# Patient Record
Sex: FEMALE | Race: WHITE | Employment: OTHER | URBAN - METROPOLITAN AREA
[De-identification: names, ages, dates, MRNs, and addresses within clinical notes are randomized per-mention and may not be internally consistent; named-entity substitution may affect disease eponyms.]

---

## 2017-04-11 ENCOUNTER — TRANSFERRED RECORDS (OUTPATIENT)
Dept: HEALTH INFORMATION MANAGEMENT | Facility: CLINIC | Age: 47
End: 2017-04-11

## 2017-06-10 ENCOUNTER — HEALTH MAINTENANCE LETTER (OUTPATIENT)
Age: 47
End: 2017-06-10

## 2017-09-19 ENCOUNTER — OFFICE VISIT (OUTPATIENT)
Dept: NEUROLOGY | Facility: CLINIC | Age: 47
End: 2017-09-19
Attending: PSYCHIATRY & NEUROLOGY
Payer: COMMERCIAL

## 2017-09-19 VITALS
DIASTOLIC BLOOD PRESSURE: 59 MMHG | HEART RATE: 59 BPM | HEIGHT: 64 IN | SYSTOLIC BLOOD PRESSURE: 105 MMHG | BODY MASS INDEX: 21.12 KG/M2 | WEIGHT: 123.7 LBS

## 2017-09-19 DIAGNOSIS — G35 MS (MULTIPLE SCLEROSIS) (H): Primary | ICD-10-CM

## 2017-09-19 PROCEDURE — 99212 OFFICE O/P EST SF 10 MIN: CPT | Mod: ZF

## 2017-09-19 ASSESSMENT — PAIN SCALES - GENERAL: PAINLEVEL: NO PAIN (0)

## 2017-09-19 NOTE — LETTER
"9/19/2017      RE: Ewa Salter  [address] [facility] [address]       REASON FOR VISIT:  Ewa Salter is a 47-year-old woman who I follow for multiple sclerosis.  She returns for routine followup.  She was last seen in 09/2016.      HISTORY OF PRESENT ILLNESS:  Ewa has been generally neurologically stable, except she feels that her hearing has gradually been worsening.  She says her children have noticed that she has a harder time hearing them, and that she has to turn the TV up louder.  She has not had any tinnitus, dizziness, double vision, etc.  Her Crohn's disease has been going great, and she was actually taken off the azathioprine in December.  This is because she met criteria for \"deep remission\", in that she had been symptom-free for 5 years, had clean endoscopies and stable/normal CRP.  She has continued to have no problems from a GI perspective since stopping the azathioprine.  She has essentially no residual MS symptoms except for some very mild sensory deficit in the left arm.  She takes 4000 units of vitamin D per day and her level in April was perfect at 70.  She shows me CBC results from March of this year which had a normal absolute lymphocyte count of 1.3.  She continues Tecfidera for MS and tolerates it well.     PHYSICAL EXAMINATION:     VITAL SIGNS:   Blood pressure 105/59.  Pulse 59.  Respiratory rate 12.   GENERAL:   She is alert and oriented.  Affect is bright and language functions are normal.     NEUROLOGIC:  Eye movements are full without diplopia or nystagmus.  Facial strength and sensation are normal.  Palate elevates midline.  Muscle bulk, tone, strength and dexterity are normal in the arms and legs.  Light touch is intact in the hands and feet.  Deep tendon reflexes are normal and symmetric and her gait is normal including normal tandem walk and negative Romberg.      IMPRESSION:  Relapsing remitting multiple sclerosis, clinically stable on dimethyl fumarate.      I spent " "30 minutes with Ewa today, greater than 50% of which was spent in counseling and coordination of care.  She asks if there is any equivalent of \"deep remission\" in MS and we discussed that in detail.  I told her that yes indeed, the relapsing stage of multiple eventually stops in all patients, and for those who do not transition into the secondary progressive phase of the disease this can be considered a permanent remission.  We discussed how long to continue immunotherapy given this fact.  I told her I generally recommend continuing treatment at least through the mid-50s if not through the 50s entirely.  We also discussed MRI monitoring and she had lots of questions about current research we are involved in which we discussed as well.      PLAN:  Follow up in 1 year with MRI at that time.      MD LEVY Haro MD             D: 2017 10:39   T: 2017 11:22   MT: SPENCER      Name:     EWA LOCO   MRN:      [id number]        Account:      [id number]   :      1970           Service Date: 2017      Document: [id number]        Levy Nettles MD      "

## 2017-09-19 NOTE — MR AVS SNAPSHOT
After Visit Summary   9/19/2017    Ewa Salter    MRN: [id number]           Patient Information     Date Of Birth          1970        Visit Information        Provider Department      9/19/2017 10:00 AM Levy Nettles MD [facility] Multiple Sclerosis        Today's Diagnoses     MS (multiple sclerosis) (H)    -  1       Follow-ups after your visit        Follow-up notes from your care team     Return in about 1 year (around 9/19/2018) for with MRI.      Your next 10 appointments already scheduled     Sep 18, 2018  9:15 AM CDT   (Arrive by 9:00 AM)   MR BRAIN W/O & W CONTRAST with [id number]   [facility] [place] [facility] ([facility])    [address]  1st Floor  [place] [address] [phone]           Take your medicines as usual, unless your doctor tells you not to. Bring a list of your current medicines to your exam (including vitamins, minerals and over-the-counter drugs).  You will be given intravenous contrast for this exam. To prepare:   The day before your exam, drink extra fluids at least six 8-ounce glasses (unless your doctor tells you to restrict your fluids).   Have a blood test (creatinine test) within 30 days of your exam. Go to your clinic or Diagnostic Imaging Department for this test.  The MRI machine uses a strong magnet. Please wear clothes without metal (snaps, zippers). A sweatsuit works well, or we may give you a hospital gown.  Please remove any body piercings and hair extensions before you arrive. You will also remove watches, jewelry, hairpins, wallets, dentures, partial dental plates and hearing aids. You may wear contact lenses, and you may be able to wear your rings. We have a safe place to keep your personal items, but it is safer to leave them at home.   **IMPORTANT** THE INSTRUCTIONS BELOW ARE ONLY FOR THOSE PATIENTS WHO HAVE BEEN TOLD THEY WILL RECEIVE SEDATION OR GENERAL ANESTHESIA DURING THEIR MRI PROCEDURE:   IF YOU WILL RECEIVE SEDATION (take medicine to help you relax during your exam):   You must get the medicine from your doctor before you arrive. Bring the medicine to the exam. Do not take it at home.   Arrive one hour early. Bring someone who can take you home after the test. Your medicine will make you sleepy. After the exam, you may not drive, take a bus or take a taxi by yourself.   No eating 8 hours before your exam. You may have clear liquids up until 4 hours before your exam. (Clear liquids include water, clear tea, black coffee and fruit juice without pulp.)  IF YOU WILL RECEIVE ANESTHESIA (be asleep for your exam):   Arrive 1 1/2 hours early. Bring someone who can take you home after the test. You may not drive, take a bus or take a taxi by yourself.   No eating 8 hours before your exam. You may have clear liquids up until 4 hours before your exam. (Clear liquids include water, clear tea, black coffee and fruit juice without pulp.)  Please call the Imaging Department at your exam site with any questions.            Sep 18, 2018 10:30 AM CDT   (Arrive by 10:15 AM)   Return Multiple Sclerosis with Levy Nettles MD   [facility] ([facility])    [address] [address] [phone]              Who to contact     If you have questions or need follow up information about today's clinic visit or your schedule please contact [facility] MULTIPLE SCLEROSIS directly at [phone].  Normal or non-critical lab and imaging results will be communicated to you by MyChart, letter or phone within 4 business days after the clinic has received the results. If you do not hear from us within 7 days, please contact the clinic through [facility] or phone. If you have a critical or abnormal lab result, we will notify you by phone as soon as possible.  Submit refill requests through [employer] or call your pharmacy and they will forward the refill  "request to us. Please allow 3 business days for your refill to be completed.          Additional Information About Your Visit        [facility]anc[facility] Information     [facility] gives you secure access to your electronic health record. If you see a primary care provider, you can also send messages to your care team and make appointments. If you have questions, please call your primary care clinic.  If you do not have a primary care provider, please call [phone] and they will assist you.        Care EveryWhere ID     This is your Care EveryWhere ID. This could be used by other organizations to access your [place] medical records  [id number]        Your Vitals Were     Pulse Height BMI (Body Mass Index)             59 1.626 m (5' 4\") 21.23 kg/m2          Blood Pressure from Last 3 Encounters:   09/19/17 105/59   09/13/16 99/63   09/15/15 114/61    Weight from Last 3 Encounters:   09/19/17 56.1 kg (123 lb 11.2 oz)   03/17/15 54.4 kg (120 lb)   03/18/14 54.4 kg (120 lb)               Primary Care Provider Office Phone # Fax #    Tesfaye Reyez [phone]       [address] [address]        Equal Access to Services     KARYN BARON : Hadii gaston ku hadasho Soomaali, waaxda luqadaha, qaybta kaalmada adeegyada, rafia dealn ck king. So [facility] [phone].    ATENCIÓN: Si habla español, tiene a valerio disposición servicios gratuitos de asistencia lingüística. Llame al [phone].    We comply with applicable federal civil rights laws and [place] laws. We do not discriminate on the basis of race, color, national origin, age, disability sex, sexual orientation or gender identity.            Thank you!     Thank you for choosing [facility] MULTIPLE SCLEROSIS  for your care. Our goal is always to provide you with excellent care. Hearing back from our patients is one way we can continue to improve our services. Please take a few minutes to complete the written " survey that you may receive in the mail after your visit with us. Thank you!             Your Updated Medication List - Protect others around you: Learn how to safely use, store and throw away your medicines at www.disposemymeds.org.          This list is accurate as of: 9/19/17 11:59 PM.  Always use your most recent med list.                   Brand Name Dispense Instructions for use Diagnosis    dimethyl fumarate 240 MG Cpdr     60 capsule    Take 1 capsule (240 mg) by mouth 2 times daily    Multiple sclerosis (H)       LOPERAMIDE HCL PO      Take 4 mg by mouth daily.        METAMUCIL PO      Take  by mouth See Admin Instructions.        vitamin D3 2000 UNITS Caps      Take 2 capsules by mouth daily.

## 2017-09-19 NOTE — NURSING NOTE
"Chief Complaint   Patient presents with     RECHECK     [facility] RETURN - MULTIPLE SCLEROSIS       Initial /59 (BP Location: Left arm, Patient Position: Sitting, Cuff Size: Adult Regular)  Pulse 59  Ht 1.626 m (5' 4\")  Wt 56.1 kg (123 lb 11.2 oz)  BMI 21.23 kg/m2 Estimated body mass index is 21.23 kg/(m^2) as calculated from the following:    Height as of this encounter: 1.626 m (5' 4\").    Weight as of this encounter: 56.1 kg (123 lb 11.2 oz).  Medication Reconciliation: complete     Gladis Valente MA      "

## 2017-09-19 NOTE — Clinical Note
"9/19/2017       RE: Ewa Salter  [address] [facility] [address]     Dear Colleague,    Thank you for referring your patient, Ewa Salter, to the [facility] MULTIPLE SCLEROSIS at [facility]. Please see a copy of my visit note below.    REASON FOR VISIT:  Ewa Salter is a 47-year-old woman who I follow for multiple sclerosis.  She returns for routine followup.  She was last seen in 09/2016.      HISTORY OF PRESENT ILLNESS:  Ewa has been generally neurologically stable, except she feels that her hearing has gradually been worsening.  She says her children have noticed that she has a harder time hearing them, and that she has to turn the TV up louder.  She has not had any tinnitus, dizziness, double vision, etc.  Her Crohn disease has been going great, and she was actually taken off the azathioprine in December.  This is because she met criteria for \"deep remission\", in that she had been symptom-free for 5 years, had clean endoscopies and stable/normal CRP.  She has continued to have no problems from a GI perspective since stopping the azathioprine.  She has essentially no residual MS symptoms except for some very mild sensory deficit in the left arm.  She takes 4000 units of vitamin D per day and her level in April was perfect at 70.  She shows me CBC results from March of this year which had a normal absolute lymphocyte count of 1.3.      PHYSICAL EXAMINATION:     VITAL SIGNS:   Blood pressure 105/59.  Pulse 59.  Respiratory rate 12.   GENERAL:   She is alert and oriented.  Affect is bright and language functions are normal.     NEUROLOGIC:  Eye movements are full without diplopia or nystagmus.  Facial strength and sensation are normal.  Palate elevates midline.  Muscle bulk, tone, strength and dexterity are normal in the arms and legs.  Light touch is intact in the hands and feet.  Deep tendon reflexes are normal and symmetric and her gait is normal " "including normal tandem walk and negative Romberg.      IMPRESSION:  Relapsing remitting multiple sclerosis, clinically stable on dimethyl fumarate.      I spent 30 minutes with Ewa today, greater than 50% of which was spent in counseling and coordination of care.  She asks if there is any equivalent of \"deep remission\" in MS and we discussed that in detail.  I told her that yes indeed, the relapsing stage of multiple eventually stops in all patients, and for those who do not transition into the secondary progressive phase of the disease this can be considered a permanent remission.  We discussed how long to continue immunotherapy given this fact.  I told her I generally recommend continuing treatment at least through the mid-50s if not through the 50s entirely.  We also discussed MRI monitoring and she had lots of questions about current research we are involved in which we discussed as well.      PLAN:  Follow up in 1 year with MRI at that time.      MD LEVY Haro MD             D: 2017 10:39   T: 2017 11:22   MT: SPENCER      Name:     EWA LOCO   MRN:      [id number]        Account:      [id number]   :      1970           Service Date: 2017      Document: [id number]       Again, thank you for allowing me to participate in the care of your patient.      Sincerely,    Levy Nettles MD    "

## 2017-09-19 NOTE — PROGRESS NOTES
"REASON FOR VISIT:  Ewa Salter is a 47-year-old woman who I follow for multiple sclerosis.  She returns for routine followup.  She was last seen in 09/2016.      HISTORY OF PRESENT ILLNESS:  Ewa has been generally neurologically stable, except she feels that her hearing has gradually been worsening.  She says her children have noticed that she has a harder time hearing them, and that she has to turn the TV up louder.  She has not had any tinnitus, dizziness, double vision, etc.  Her Crohn's disease has been going great, and she was actually taken off the azathioprine in December.  This is because she met criteria for \"deep remission\", in that she had been symptom-free for 5 years, had clean endoscopies and stable/normal CRP.  She has continued to have no problems from a GI perspective since stopping the azathioprine.  She has essentially no residual MS symptoms except for some very mild sensory deficit in the left arm.  She takes 4000 units of vitamin D per day and her level in April was perfect at 70.  She shows me CBC results from March of this year which had a normal absolute lymphocyte count of 1.3.  She continues Tecfidera for MS and tolerates it well.     PHYSICAL EXAMINATION:     VITAL SIGNS:   Blood pressure 105/59.  Pulse 59.  Respiratory rate 12.   GENERAL:   She is alert and oriented.  Affect is bright and language functions are normal.     NEUROLOGIC:  Eye movements are full without diplopia or nystagmus.  Facial strength and sensation are normal.  Palate elevates midline.  Muscle bulk, tone, strength and dexterity are normal in the arms and legs.  Light touch is intact in the hands and feet.  Deep tendon reflexes are normal and symmetric and her gait is normal including normal tandem walk and negative Romberg.      IMPRESSION:  Relapsing remitting multiple sclerosis, clinically stable on dimethyl fumarate.      I spent 30 minutes with Ewa today, greater than 50% of which was spent in counseling and " "coordination of care.  She asks if there is any equivalent of \"deep remission\" in MS and we discussed that in detail.  I told her that yes indeed, the relapsing stage of multiple eventually stops in all patients, and for those who do not transition into the secondary progressive phase of the disease this can be considered a permanent remission.  We discussed how long to continue immunotherapy given this fact.  I told her I generally recommend continuing treatment at least through the mid-50s if not through the 50s entirely.  We also discussed MRI monitoring and she had lots of questions about current research we are involved in which we discussed as well.      PLAN:  Follow up in 1 year with MRI at that time.      MD YAMILA Haro MD             D: 2017 10:39   T: 2017 11:22   MT: SPENCER      Name:     MELY LOCO   MRN:      [id number]        Account:      [id number]   :      1970           Service Date: 2017      Document: [id number]      "

## 2017-11-27 ENCOUNTER — TELEPHONE (OUTPATIENT)
Dept: NEUROLOGY | Facility: CLINIC | Age: 47
End: 2017-11-27

## 2017-11-27 DIAGNOSIS — G35 MULTIPLE SCLEROSIS (H): ICD-10-CM

## 2017-11-27 RX ORDER — DIMETHYL FUMARATE 240 MG/1
CAPSULE ORAL
Qty: 60 CAPSULE | Refills: 11 | Status: SHIPPED | OUTPATIENT
Start: 2017-11-27 | End: 2018-11-26

## 2017-11-27 NOTE — TELEPHONE ENCOUNTER
Prior Authorization Specialty Medication Request     Medication/Dose: Tecfidra 240mg  Frequency: BID                         Route: PO  Diagnosis and ICD: Relapsing Remitting Multiple Sclerosis, G35  New/Renewal/Insurance Change PA:Renewal     Important Lab Values:      Previously Tried and Failed Therapies: Been on Tecfidera since 2013, Copaxone 20mg prior to that     Rationale: Patient has been stable on current therapy.     Would you like to include any research articles? na                        If yes please include the hyperlink(s) below or fax @ [phone].                         (Include Name and MRN)     If you received a fax notification from an outside Pharmacy;  Pharmacy Name:[facility]  Pharmacy #:[phone]  Pharmacy Fax:[phone]

## 2017-11-27 NOTE — TELEPHONE ENCOUNTER
Prior Authorization Approval    Authorization Effective Date: 11/27/2017  Authorization Expiration Date:    Medication: Tecfidera 240mg APPROVED  Approved Dose/Quantity: 60 PER 30 DAYS  Reference #: N/A   Insurance Company: [employer] - Phone [phone] Fax [phone]  Expected CoPay: N/A     CoPay Card Available:      Foundation Assistance Needed:    Which Pharmacy is filling the prescription (Not needed for infusion/clinic administered): [facility] [place]  Pharmacy Notified: No  Patient Notified: No

## 2017-11-27 NOTE — TELEPHONE ENCOUNTER
PA Initiation    Medication: Tecfidera 240mg  Insurance Company: [employer] - Phone [phone] Fax [phone]  Pharmacy Filling the Rx: [facility] [place]  Filling Pharmacy Phone:    Filling Pharmacy Fax:    Start Date: 11/27/2017

## 2017-11-27 NOTE — TELEPHONE ENCOUNTER
Received refill request for Tecfidera from [facility]; Patient was last seen in September and has follow up appointment in September 2018 with Dr. Nettles; Refilled for 1 year per MS refill protocol.    Sheela Mathews MS RN Care Coordinator

## 2018-03-07 ENCOUNTER — TELEPHONE (OUTPATIENT)
Dept: NEUROLOGY | Facility: CLINIC | Age: 48
End: 2018-03-07

## 2018-03-07 DIAGNOSIS — G35 MULTIPLE SCLEROSIS (H): Primary | ICD-10-CM

## 2018-03-07 NOTE — TELEPHONE ENCOUNTER
"Per call center: Call from PT as she is experiencing some tingling/numbness in her leg.  She recently had a procedure done and needs to find out if it is procedure related or something with the MS as she has never experienced this type of feeling before.  I attemped to reach Nayla and since PT stated this is not extremely urgent, she is ok with a message but would like a CB today as she is concerned.   PT can be reached at [phone]     Called patient to discuss. Patient states she had surgery on her Left knee (partical meniscus removal) in December. Symptom of numbness/tingling has been going on for about a week and is diffuse from above the knee to the foot. It's hard for her to pinpoint. Feeling comes and goes. \"It's a weird sensation, and it doesn't feel right.\" Only other symptom she has noticed is that she is very tired, especially the last few days. No B/B changes, vision, balance issues. No recent illness.   Nothing makes it feel better (rest, ice).   Dr. Nettles, patient is unsure of what to do moving forward. She isn't sure if it's MS related or from her surgery. What do you recommend?    Thanks,  Tiffanie Thomas, RN Care Coordinator    "

## 2018-03-07 NOTE — TELEPHONE ENCOUNTER
Called Ewa to go over Dr. Nettles's input. There was no answer, left [facility] asking for a call back.

## 2018-03-07 NOTE — TELEPHONE ENCOUNTER
The best way to determine if this is due to new MS activity is to get MRI (cervical and thoracic, with and without).  And since the surgery was several months ago and symptoms started only a week ago, I think attributing it to the surgery is a bit of a stretch.  I suggest we do the MRIs - it's been a long time since she has had spinal imaging anyway.

## 2018-03-07 NOTE — TELEPHONE ENCOUNTER
Received return call from patient. She is agreeable to MRIs. Orders placed on behalf of Dr. Nettles. Provided patient with imaging phone number to call and schedule.

## 2018-03-08 ENCOUNTER — TRANSFERRED RECORDS (OUTPATIENT)
Dept: HEALTH INFORMATION MANAGEMENT | Facility: CLINIC | Age: 48
End: 2018-03-08

## 2018-03-08 NOTE — TELEPHONE ENCOUNTER
Patient called requesting MRI orders be faxed to [facility] (phone [phone] fax [phone]); Orders have been faxed accordingly.    Sheela Mathews MS RN Care Coordinator

## 2018-03-12 NOTE — TELEPHONE ENCOUNTER
Per call center: Call from PT re: MRI results.  She was told by [facility] that they would be sent her within 48 hours and I let her know we received them.  She just needs a call to [phone] to discuss the results.     Dr. Nettles, please take a look at her MRI results when you get chance. Images are in PACS.     Thanks,  Tiffanie Thomas, RN Care Coordinator

## 2018-03-12 NOTE — TELEPHONE ENCOUNTER
The thoracic spine MRI is normal (the one most likely to show a new lesion if her symptoms were due to MS relapse).  The cervical spine MRI shows one new lesion on the right side of the spinal cord at the C3 level, which does not enhance.  This is almost certainly not related to her symptoms, because a) it is not enhancing, and b) it would be expected to cause numbness/tingling on the right side of the head and neck, not the left leg.  So, no evidence of MS relapse on these MRIs (good!).  How is she doing symptom-wise - is the leg still numb/tingly?  Is it getting better, getting worse, or staying the same?

## 2018-03-12 NOTE — TELEPHONE ENCOUNTER
Images are still not uploaded to patient's chart. Called [facility]room and they don't have anything. Called [facility] and requested that images be pushed. They will do this.

## 2018-03-13 NOTE — TELEPHONE ENCOUNTER
"Called and spoke with Ewa and reported her MRI results as outlined by Dr. Nettles. She states that her symptoms are unchanged. \"It's like there is a jolt of electricity going through my leg.\" This starts above the site of her knee surgery and travels below the site. The symptoms does not seem to be correlated with any particular activity or position. It happens frequently. Patient is wondering what the next appropriate follow up is? Dr. Nettles, do you have any advice? I told her it may not be a bad idea to update her surgeon since we have not found a cause for this r/t to her MS.   "

## 2018-03-22 NOTE — TELEPHONE ENCOUNTER
If it's painful/uncomfortable/bothering her, can treat symptomatically with gabapentin (would start at 300 mg bid, can titrate up at 300 mg per day as tolerated, up to max of 900 mg tid).  I also agree with keeping the surgeon updated

## 2018-03-23 NOTE — TELEPHONE ENCOUNTER
[email or web address] message sent to patient to follow up on how she is doing and the potential for medication treatment for her symptoms.    Sheela Mathews MS RN Care Coordinator

## 2018-09-18 ENCOUNTER — OFFICE VISIT (OUTPATIENT)
Dept: NEUROLOGY | Facility: CLINIC | Age: 48
End: 2018-09-18
Attending: PSYCHIATRY & NEUROLOGY
Payer: COMMERCIAL

## 2018-09-18 ENCOUNTER — RADIANT APPOINTMENT (OUTPATIENT)
Dept: MRI IMAGING | Facility: CLINIC | Age: 48
End: 2018-09-18
Attending: PSYCHIATRY & NEUROLOGY
Payer: COMMERCIAL

## 2018-09-18 VITALS
SYSTOLIC BLOOD PRESSURE: 114 MMHG | WEIGHT: 125.4 LBS | HEART RATE: 65 BPM | BODY MASS INDEX: 21.41 KG/M2 | DIASTOLIC BLOOD PRESSURE: 70 MMHG | HEIGHT: 64 IN

## 2018-09-18 DIAGNOSIS — G35 MULTIPLE SCLEROSIS (H): Primary | ICD-10-CM

## 2018-09-18 DIAGNOSIS — K50.913 CROHN'S DISEASE WITH FISTULA, UNSPECIFIED GASTROINTESTINAL TRACT LOCATION (H): ICD-10-CM

## 2018-09-18 DIAGNOSIS — G35 MS (MULTIPLE SCLEROSIS) (H): ICD-10-CM

## 2018-09-18 RX ORDER — GADOBUTROL 604.72 MG/ML
7.5 INJECTION INTRAVENOUS ONCE
Status: COMPLETED | OUTPATIENT
Start: 2018-09-18 | End: 2018-09-18

## 2018-09-18 RX ADMIN — GADOBUTROL 5.5 ML: 604.72 INJECTION INTRAVENOUS at 09:18

## 2018-09-18 ASSESSMENT — PAIN SCALES - GENERAL: PAINLEVEL: NO PAIN (0)

## 2018-09-18 NOTE — MR AVS SNAPSHOT
After Visit Summary   9/18/2018    Ewa Salter    MRN: [id number]           Patient Information     Date Of Birth          1970        Visit Information        Provider Department      9/18/2018 10:30 AM Levy Nettles MD [facility] Multiple Sclerosis        Today's Diagnoses     Multiple sclerosis (H)    -  1      Care Instructions    1.  Blood test for LUANNE virus antibody (can get through your primary clinic).  2.  F/u in one year  3.  Continue Tecfidera   4.  F/u in one year with MRI          Follow-ups after your visit        Follow-up notes from your care team     Return in about 1 year (around 9/18/2019) for with MRI.      Your next 10 appointments already scheduled     Sep 17, 2019  9:15 AM CDT   MR BRAIN W/O CONTRAST with [id number]   [facility] [place] [facility] ([facility])    [address] [address] [phone]           Take your medicines as usual, unless your doctor tells you not to. Bring a list of your current medicines to your exam (including vitamins, minerals and over-the-counter drugs). Also bring the results of similar scans you may have had.  Please remove any body piercings and hair extensions before you arrive.  Follow your doctor s orders. If you do not, we may have to postpone your exam.  You may or may not receive IV contrast for this exam pending the discretion of the Radiologist.  You do not need to do anything special to prepare.  The MRI machine uses a strong magnet. Please wear clothes without metal (snaps, zippers). A sweatsuit works well, or we may give you a hospital gown.   **IMPORTANT** THE INSTRUCTIONS BELOW ARE ONLY FOR THOSE PATIENTS WHO HAVE BEEN PRESCRIBED SEDATION OR GENERAL ANESTHESIA DURING THEIR MRI PROCEDURE:  IF YOUR DOCTOR PRESCRIBED ORAL SEDATION (take medicine to help you relax during your exam):   You must get the medicine from your doctor (oral medication) before you arrive.  Bring the medicine to the exam. Do not take it at home. You ll be told when to take it upon arriving for your exam.   Arrive one hour early. Bring someone who can take you home after the test. Your medicine will make you sleepy. After the exam, you may not drive, take a bus or take a taxi by yourself.  IF YOUR DOCTOR PRESCRIBED IV SEDATION:   Arrive one hour early. Bring someone who can take you home after the test. Your medicine will make you sleepy. After the exam, you may not drive, take a bus or take a taxi by yourself.   No eating 6 hours before your exam. You may have clear liquids up until 4 hours before your exam. (Clear liquids include water, clear tea, black coffee and fruit juice without pulp.)  IF YOUR DOCTOR PRESCRIBED ANESTHESIA (be asleep for your exam):   Arrive 1 1/2 hours early. Bring someone who can take you home after the test. You may not drive, take a bus or take a taxi by yourself.   No eating 8 hours before your exam. You may have clear liquids up until 4 hours before your exam. (Clear liquids include water, clear tea, black coffee and fruit juice without pulp.)   You will spend four to five hours in the recovery room.  Please call the Imaging Department at your exam site with any questions.            Sep 17, 2019 10:30 AM CDT   (Arrive by 10:15 AM)   Return Multiple Sclerosis with Levy Nettles MD   [facility] Multiple Sclerosis ([facility])    [address] [place] [address] [phone]              Future tests that were ordered for you today     Open Future Orders        Priority Expected Expires Ordered    MR Brain w/o Contrast Routine 9/17/2019 9/18/2019 9/18/2018    LUANNE Virus Antibody (with Index) with Reflex to Inhibition Assay - [id number]: Laboratory Miscellaneous Order Routine  9/18/2019 9/18/2018            Who to contact     If you have questions or need follow up information about today's clinic visit or your schedule  "please contact [facility] directly at [phone].  Normal or non-critical lab and imaging results will be communicated to you by MyChart, letter or phone within 4 business days after the clinic has received the results. If you do not hear from us within 7 days, please contact the clinic through MyChart or phone. If you have a critical or abnormal lab result, we will notify you by phone as soon as possible.  Submit refill requests through [employer] or call your pharmacy and they will forward the refill request to us. Please allow 3 business days for your refill to be completed.          Additional Information About Your Visit        [employer] Information     [employer] gives you secure access to your electronic health record. If you see a primary care provider, you can also send messages to your care team and make appointments. If you have questions, please call your primary care clinic.  If you do not have a primary care provider, please call [phone] and they will assist you.        Care EveryWhere ID     This is your Care EveryWhere ID. This could be used by other organizations to access your [facility] medical records  [id number]        Your Vitals Were     Pulse Height BMI (Body Mass Index)             65 1.626 m (5' 4\") 21.52 kg/m2          Blood Pressure from Last 3 Encounters:   09/18/18 114/70   09/19/17 105/59   09/13/16 99/63    Weight from Last 3 Encounters:   09/18/18 56.9 kg (125 lb 6.4 oz)   09/19/17 56.1 kg (123 lb 11.2 oz)   03/17/15 54.4 kg (120 lb)               Primary Care Provider Office Phone # Fax #    Tesfaye Roweisidoro [phone]       [address] [address]        Equal Access to Services     [facility] LORAINE AH: Nia Ty, wajasonda luqadaha, qaybta kaalmada frank, rafia king. So [facility] [phone].    ATENCIÓN: Si habla español, tiene a valerio disposición servicios gratuitos de " asistencia lingüística. Alf al [phone].    We comply with applicable federal civil rights laws and [place] laws. We do not discriminate on the basis of race, color, national origin, age, disability, sex, sexual orientation, or gender identity.            Thank you!     Thank you for choosing [facility]  for your care. Our goal is always to provide you with excellent care. Hearing back from our patients is one way we can continue to improve our services. Please take a few minutes to complete the written survey that you may receive in the mail after your visit with us. Thank you!             Your Updated Medication List - Protect others around you: Learn how to safely use, store and throw away your medicines at www.disposemymeds.org.          This list is accurate as of 9/18/18 11:38 AM.  Always use your most recent med list.                   Brand Name Dispense Instructions for use Diagnosis    LOPERAMIDE HCL PO      Take 4 mg by mouth daily.        METAMUCIL PO      Take  by mouth See Admin Instructions.        TECFIDERA 240 MG Cpdr   Generic drug:  dimethyl fumarate     60 capsule    TAKE 1 CAPSULE BY MOUTH TWICE A DAY    Multiple sclerosis (H)       vitamin D3 2000 units Caps      Take 2 capsules by mouth daily.

## 2018-09-18 NOTE — PATIENT INSTRUCTIONS
1.  Blood test for LUANNE virus antibody (can get through your primary clinic).  2.  F/u in one year  3.  Continue Tecfidera   4.  F/u in one year with MRI

## 2018-09-18 NOTE — PROGRESS NOTES
"[facility] [place]   Neurology Progress Note  Ewa Salter  [id number]  09/18/2018    Brief Summary: Ewa Salter is a 48 year old year old female with h/o Crohn's (previously on azathioprine, s/p colectomy) who presents for f/u. She has been tolerating dimethyl fumarate for several years without clinical or radiographic evidence of relapse until today. MR Brain this morning revealed 3 new small lesions without clinical correlate.  sei  Subjective:  Notes progressively worsening hearing b/l over the past two years. No new memory, speech, or vision changes. No swallowing difficulty, weakness, numbness, clumsiness, or imbalance. Did have recent perianal fistula which required several surgeries and is concerning for Crohn's relapse.     Objective   /70 (BP Location: Left arm, Patient Position: Chair, Cuff Size: Adult Regular)  Pulse 65  Ht 1.626 m (5' 4\")  Wt 56.9 kg (125 lb 6.4 oz)  BMI 21.52 kg/m2  General: pt laying comfortably in bed, breathing easily on ra, in NAD   HEENT: no oral ulcers   Chest: cta   Heart: rrr  Abdomen: soft, nt, nd, +BS  Ext: no edema   Skin: no rashes  Neuro:   -MS: alert, speech fluent and coherent  -CN: vff, perrla, eomi, facial sensation and movement intact b/l, hearing intact to conversation, palate raises symmetrically, shoulder shrug and scm intact, tongue midline  -Motor: tone and bulk normal   --LUE: 5/5 shoulder abd, arm flex/ext, wrist flex/ext, finger flex/ext/abd/add. Slow R finger tapping.  --RUE: 5/5 shoulder abd, arm flex/ext, wrist flex/ext, finger flex/ext/abd/add  --LLE: 5/5 hip flexor, leg flex/ext, plantar/dorsiflexion. Slow L foot tapping.  --RLE: 5/5 hip flexor, leg flex/ext, plantar/dorsiflexion  -Reflexes: normoactive and symmetric at achilles, patella, brachioradialis, and biceps. Toes downgoing b/l   -Sensory: intact to light touch and pinprick in all extremities  -Coordination: intact to FNF, H2S b/l  -Gait: " normal station, stride length, and arm swing. Able to tandem walk.    Investigations    MR Brain (9/2018)  Impression:  1. The study demonstrates 15-20 foci of T2-hyperintensity within the  cerebral white matter consistent with the clinical suspicion of  demyelinating disease.   2. 3 new, but nonenhancing T2 hyperintense plaques in the inferior  right frontal lobe, posterior left frontal lobe, and left parietal  lobe.  3. No intracranial enhancement to suggest active demyelination.    Impression:  Ewa Salter is a 48 year old year old female h/o Crohn's who presents for f/u.     #Relapsing Remitting MS:  MR today revealed 3 new small lesions since last year, that were all asymptomatic. Will continue current disease modifying therapy (dimethyl fumarate) for now, but would have very low threshold to change to more intensive therapy. With new perianal fistula, concerning for Crohn's relapse, an obvious choice would be a drug that could treat both: natalizumab. Will thus check LUANNE virus antibody today. Several other options discussed: rituximab, teriflunomide. For her Crohn's, would AVOID TNFa inhibitors as these rx could lead to MS relapse. Okay to resume azathioprine if necessary.     Recommendations:   -continue dimethyl fumarate  -check CBC with diff, vitamin D level, LUANNE virus  -repeat MR Brain before next visit  -RTC 1 year  --if medical therapy needed for Crohn's, consider natalizumab (if LUANNE virus negative) as this could treat both MS and Crohn's. Avoid TNFa inhibitors for Crohn's as this could cause or predispose patient to MS relapse.      Patient was seen and discussed with attending neurologist, Dr. Tho San MD  Neurology G4  [phone]     I saw and examined this patient, and have read and edited the resident's note.  I agree with the findings, assessment, and plan.  I spent 44 minutes with the patient, greater than 50% in counseling and coordination of care and reviewing her MRI  together.  We discussed the implication of these new lesions, and treatment options given her apparently recurrent Crohn's disease.  She will be seeing a new gastroenterologist soon, and we will make no final decisions until after that.  If azathioprine is re-introduced, we may decide to have her stay on the DMF, otherwise options as discussed above.    Levy Nettles MD

## 2018-09-18 NOTE — LETTER
"9/18/2018      RE: Ewa Salter  [address] [place] [address]       [facility] [place]   Neurology Progress Note  Ewa Salter  [id number]  09/18/2018    Brief Summary: Ewa Salter is a 48 year old year old female with h/o Crohn's (previously on azathioprine, s/p colectomy) who presents for f/u. She has been tolerating dimethyl fumarate for several years without clinical or radiographic evidence of relapse until today. MR Brain this morning revealed 3 new small lesions without clinical correlate.  sei  Subjective:  Notes progressively worsening hearing b/l over the past two years. No new memory, speech, or vision changes. No swallowing difficulty, weakness, numbness, clumsiness, or imbalance. Did have recent perianal fistula which required several surgeries and is concerning for Crohn's relapse.     Objective   /70 (BP Location: Left arm, Patient Position: Chair, Cuff Size: Adult Regular)  Pulse 65  Ht 1.626 m (5' 4\")  Wt 56.9 kg (125 lb 6.4 oz)  BMI 21.52 kg/m2  General: pt laying comfortably in bed, breathing easily on ra, in NAD   HEENT: no oral ulcers   Chest: cta   Heart: rrr  Abdomen: soft, nt, nd, +BS  Ext: no edema   Skin: no rashes  Neuro:   -MS: alert, speech fluent and coherent  -CN: vff, perrla, eomi, facial sensation and movement intact b/l, hearing intact to conversation, palate raises symmetrically, shoulder shrug and scm intact, tongue midline  -Motor: tone and bulk normal   --LUE: 5/5 shoulder abd, arm flex/ext, wrist flex/ext, finger flex/ext/abd/add. Slow R finger tapping.  --RUE: 5/5 shoulder abd, arm flex/ext, wrist flex/ext, finger flex/ext/abd/add  --LLE: 5/5 hip flexor, leg flex/ext, plantar/dorsiflexion. Slow L foot tapping.  --RLE: 5/5 hip flexor, leg flex/ext, plantar/dorsiflexion  -Reflexes: normoactive and symmetric at achilles, patella, brachioradialis, and biceps. Toes downgoing b/l   -Sensory: intact to light " touch and pinprick in all extremities  -Coordination: intact to FNF, H2S b/l  -Gait: normal station, stride length, and arm swing. Able to tandem walk.    Investigations    MR Brain (9/2018)  Impression:  1. The study demonstrates 15-20 foci of T2-hyperintensity within the  cerebral white matter consistent with the clinical suspicion of  demyelinating disease.   2. 3 new, but nonenhancing T2 hyperintense plaques in the inferior  right frontal lobe, posterior left frontal lobe, and left parietal  lobe.  3. No intracranial enhancement to suggest active demyelination.    Impression:  Ewa Salter is a 48 year old year old female h/o Crohn's who presents for f/u.     #Relapsing Remitting MS:  MR today revealed 3 new small lesions since last year, that were all asymptomatic. Will continue current disease modifying therapy (dimethyl fumarate) for now, but would have very low threshold to change to more intensive therapy. With new perianal fistula, concerning for Crohn's relapse, an obvious choice would be a drug that could treat both: natalizumab. Will thus check LUANNE virus antibody today. Several other options discussed: rituximab, teriflunomide. For her Crohn's, would AVOID TNFa inhibitors as these rx could lead to MS relapse. Okay to resume azathioprine if necessary.     Recommendations:   -continue dimethyl fumarate  -check CBC with diff, vitamin D level, LUANNE virus  -repeat MR Brain before next visit  -RTC 1 year  --if medical therapy needed for Crohn's, consider natalizumab (if LUANNE virus negative) as this could treat both MS and Crohn's. Avoid TNFa inhibitors for Crohn's as this could cause or predispose patient to MS relapse.      Patient was seen and discussed with attending neurologist, Dr. Tho San MD  Neurology G4  [phone]     I saw and examined this patient, and have read and edited the resident's note.  I agree with the findings, assessment, and plan.  I spent 44 minutes with the  patient, greater than 50% in counseling and coordination of care and reviewing her MRI together.  We discussed the implication of these new lesions, and treatment options given her apparently recurrent Crohn's disease.  She will be seeing a new gastroenterologist soon, and we will make no final decisions until after that.  If azathioprine is re-introduced, we may decide to have her stay on the DMF, otherwise options as discussed above.      Levy Nettles MD

## 2018-09-18 NOTE — DISCHARGE INSTRUCTIONS
MRI Contrast Discharge Instructions    The IV contrast you received today will pass out of your body in your  urine. This will happen in the next 24 hours. You will not feel this process.  Your urine will not change color.    Drink at least 4 extra glasses of water or juice today (unless your doctor  has restricted your fluids). This reduces the stress on your kidneys.  You may take your regular medicines.    If you are on dialysis: It is best to have dialysis today.    If you have a reaction: Most reactions happen right away. If you have  any new symptoms after leaving the hospital (such as hives or swelling),  call your hospital at the correct number below. Or call your family doctor.  If you have breathing distress or wheezing, call 911.    Special instructions: ***    I have read and understand the above information.    Signature:______________________________________ Date:___________    Staff:__________________________________________ Date:___________     Time:__________    [place] [facility] Departments:    ___[place]: [phone]  ___[facility]: [phone]  ___[place]: [phone] ___[facility]: [phone]  ___[facility]: [phone]  ___[facility]: [phone]  ___[phone]  ___[facility]: [phone]  ___[phone]

## 2018-09-21 PROBLEM — K50.90 CROHN'S DISEASE (H): Status: ACTIVE | Noted: 2018-09-21

## 2018-11-26 DIAGNOSIS — G35 MULTIPLE SCLEROSIS (H): ICD-10-CM

## 2018-11-26 RX ORDER — DIMETHYL FUMARATE 240 MG/1
CAPSULE ORAL
Qty: 60 CAPSULE | Refills: 11 | Status: SHIPPED | OUTPATIENT
Start: 2018-11-26 | End: 2019-11-14

## 2018-11-26 RX ORDER — DIMETHYL FUMARATE 240 MG/1
CAPSULE ORAL
Qty: 60 CAPSULE | Refills: 11 | OUTPATIENT
Start: 2018-11-26

## 2018-11-26 NOTE — TELEPHONE ENCOUNTER
Received refill request for Tecfidera from [facility]; Patient was last seen in September and has follow up appointment in September 2019 with Dr. Nettles; Refilled for 1 year per MS refill protocol.    Sheela Mathews MS RN Care Coordinator

## 2019-07-02 ENCOUNTER — OFFICE VISIT (OUTPATIENT)
Dept: NEUROLOGY | Facility: CLINIC | Age: 49
End: 2019-07-02
Attending: PSYCHIATRY & NEUROLOGY
Payer: COMMERCIAL

## 2019-07-02 VITALS
SYSTOLIC BLOOD PRESSURE: 113 MMHG | WEIGHT: 124.9 LBS | DIASTOLIC BLOOD PRESSURE: 69 MMHG | HEIGHT: 64 IN | BODY MASS INDEX: 21.32 KG/M2 | HEART RATE: 80 BPM

## 2019-07-02 DIAGNOSIS — G35 MS (MULTIPLE SCLEROSIS) (H): Primary | ICD-10-CM

## 2019-07-02 ASSESSMENT — PAIN SCALES - GENERAL: PAINLEVEL: NO PAIN (0)

## 2019-07-02 ASSESSMENT — MIFFLIN-ST. JEOR: SCORE: 1186.54

## 2019-07-02 NOTE — PROGRESS NOTES
"Service Date: 07/02/2019      REASON FOR VISIT:  Ewa Salter is a 49-year-old woman who I follow for multiple sclerosis.  She returns for routine followup.  I last saw her in 09/2018.      HISTORY OF PRESENT ILLNESS:  Ewa tells me she has been doing \"pretty good but not at my best.\"  When I saw her last fall, she had developed a perianal fistula which was felt to possibly be a worsening/recurrence of her Crohn disease.  She was restarted on azathioprine which she had taken for many years, but this time she did not tolerate it well and ultimately developed pancreatitis.  The dose was decreased, but she still did not tolerate it and it was eventually stopped in April.  She had a \"pouchoscopy\" which suggested pouchitis.  She basically had been feeling poorly all spring, more fatigued, with GI cramping and bowel urgency.  She was started eventually on Flagyl, which again made her feel sick and ultimately switched to ciprofloxacin, which she is just finishing up.  She follows for gastrointestinal issues at [facility], formerly at [place].  She has never taken mycophenolate.      In the context of all of this ongoing GI symptomatology, she has felt some subtle new or worsened neurologic symptoms as well.  Her gait feels more \"stumbly\" although she says this is not something an impartial observer would likely recognize.  She just feel she cannot compensate as well if she misses a step or walks on an uneven surface.  She has not had any falls.  She also has developed some mild numbness in the right hand that is basically constant and she is not really sure of how long it has been there.  Motor function in the hands and arms is fine.  She does not have any vision loss, double vision, etc.  No vertigo.  No dysphagia or dysarthria.  She has some longstanding word finding difficulties, but no overt cognitive impairment and continues to work at a cognitively demanding job as an .  She has some mild " bladder urgency but no incontinence.      PHYSICAL EXAMINATION:  Blood pressure 113/69, pulse 80.  Weight 124 pounds.  She is alert and oriented.  Affect is bright and language functions are normal.  Cranial nerves are unremarkable.  Muscle bulk, tone, strength and dexterity are normal in the arms and legs.  Light touch is intact in the hands and feet.  Reflexes are normal and symmetric and her gait is narrow based and stable with normal tandem walk and negative Romberg.      IMPRESSION:  Relapsing remitting multiple sclerosis, clinically stable despite some subjective complaints of subtle balance disturbance and right hand numbness.  I spent 42 minutes with Ewa today, greater than 50% of which was spent in counseling and coordination of care.  We also reviewed again her last MRI scan which showed a few new lesions.  We discussed a variety of issues today.  First of all, we discussed the possible explanations for new or worsened neurologic symptoms in a patient with MS.  These include relapse, pseudorelapse, entering the progressive stage of the disease, or a process entirely separate from MS.  The timing of her new symptoms is not consistent with MS relapse.  I find no evidence on exam of secondary progressive MS.  I told her I think most likely this is either subtle worsening of symptoms in the context of her ongoing GI issues this spring or perhaps just more related to normal aging.  With the new lesions last year, we had planned on repeating MRI this year and that is scheduled for September.  I told her I think we should move that up and do it essentially now.  If there were new lesions, I would recommend treatment change and we discussed the options for that.  Basically, that would be either natalizumab or rituximab.  She did not end up getting the LUANNE virus antibody we ordered last fall because she tells me her gastroenterologist told her that would not be an option for her because of her long duration of  treatment with azathioprine.  I told her I disagree completely, and the important factor is whether or not she harbors the LUANNE virus.  Prior immunotherapy was a factor in assessing PML risk before we had the LUANNE virus antibody test, but for those who do not harbor the virus, there is no risk of getting the infection no matter what their prior immunosuppression.      PLAN:     1.  Reschedule MRI of the brain at her convenience.   2.  Return to clinic in 6 months.         YAMILA NOVAK MD             D: 2019   T: 2019   MT: [email or web address]      Name:     MELY LOCO   MRN:      [id number]        Account:      [id number]   :      1970           Service Date: 2019      Document: [id number]

## 2019-07-02 NOTE — LETTER
"7/2/2019      RE: Ewa Salter  [address] [place] [address]       Service Date: 07/02/2019      REASON FOR VISIT:  Ewa Salter is a 49-year-old woman who I follow for multiple sclerosis.  She returns for routine followup.  I last saw her in 09/2018.      HISTORY OF PRESENT ILLNESS:  Ewa tells me she has been doing \"pretty good but not at my best.\"  When I saw her last fall, she had developed a perianal fistula which was felt to possibly be a worsening/recurrence of her Crohn disease.  She was restarted on azathioprine which she had taken for many years, but this time she did not tolerate it well and ultimately developed pancreatitis.  The dose was decreased, but she still did not tolerate it and it was eventually stopped in April.  She had a \"pouchoscopy\" which suggested pouchitis.  She basically had been feeling poorly all spring, more fatigued, with GI cramping and bowel urgency.  She was started eventually on Flagyl, which again made her feel sick and ultimately switched to ciprofloxacin, which she is just finishing up.  She follows for gastrointestinal issues at [facility], formerly at [facility].  She has never taken mycophenolate.      In the context of all of this ongoing GI symptomatology, she has felt some subtle new or worsened neurologic symptoms as well.  Her gait feels more \"stumbly\" although she says this is not something an impartial observer would likely recognize.  She just feel she cannot compensate as well if she misses a step or walks on an uneven surface.  She has not had any falls.  She also has developed some mild numbness in the right hand that is basically constant and she is not really sure of how long it has been there.  Motor function in the hands and arms is fine.  She does not have any vision loss, double vision, etc.  No vertigo.  No dysphagia or dysarthria.  She has some longstanding word finding difficulties, but no overt cognitive impairment and " continues to work at a cognitively demanding job as an .  She has some mild bladder urgency but no incontinence.      PHYSICAL EXAMINATION:  Blood pressure 113/69, pulse 80.  Weight 124 pounds.  She is alert and oriented.  Affect is bright and language functions are normal.  Cranial nerves are unremarkable.  Muscle bulk, tone, strength and dexterity are normal in the arms and legs.  Light touch is intact in the hands and feet.  Reflexes are normal and symmetric and her gait is narrow based and stable with normal tandem walk and negative Romberg.      IMPRESSION:  Relapsing remitting multiple sclerosis, clinically stable despite some subjective complaints of subtle balance disturbance and right hand numbness.  I spent 42 minutes with Ewa today, greater than 50% of which was spent in counseling and coordination of care.  We also reviewed again her last MRI scan which showed a few new lesions.  We discussed a variety of issues today.  First of all, we discussed the possible explanations for new or worsened neurologic symptoms in a patient with MS.  These include relapse, pseudorelapse, entering the progressive stage of the disease, or a process entirely separate from MS.  The timing of her new symptoms is not consistent with MS relapse.  I find no evidence on exam of secondary progressive MS.  I told her I think most likely this is either subtle worsening of symptoms in the context of her ongoing GI issues this spring or perhaps just more related to normal aging.  With the new lesions last year, we had planned on repeating MRI this year and that is scheduled for September.  I told her I think we should move that up and do it essentially now.  If there were new lesions, I would recommend treatment change and we discussed the options for that.  Basically, that would be either natalizumab or rituximab.  She did not end up getting the LUANNE virus antibody we ordered last fall because she tells me her  gastroenterologist told her that would not be an option for her because of her long duration of treatment with azathioprine.  I told her I disagree completely, and the important factor is whether or not she harbors the LUANNE virus.  Prior immunotherapy was a factor in assessing PML risk before we had the LUANNE virus antibody test, but for those who do not harbor the virus, there is no risk of getting the infection no matter what their prior immunosuppression.      PLAN:     1.  Reschedule MRI of the brain at her convenience.   2.  Return to clinic in 6 months.         YAMILA NOVAK MD             D: 2019   T: 2019   MT: virgilio      Name:     MELY LOCO   MRN:      [id number]        Account:      [id number]   :      1970           Service Date: 2019      Document: [id number]

## 2019-07-25 ENCOUNTER — ANCILLARY PROCEDURE (OUTPATIENT)
Dept: MRI IMAGING | Facility: CLINIC | Age: 49
End: 2019-07-25
Attending: PSYCHIATRY & NEUROLOGY
Payer: COMMERCIAL

## 2019-07-25 DIAGNOSIS — G35 MULTIPLE SCLEROSIS (H): ICD-10-CM

## 2019-08-21 ENCOUNTER — TELEPHONE (OUTPATIENT)
Dept: NEUROLOGY | Facility: CLINIC | Age: 49
End: 2019-08-21

## 2019-08-21 NOTE — TELEPHONE ENCOUNTER
[facility] Call Center    Phone Message    May a detailed message be left on voicemail: yes    Reason for Call: Requesting Results   Name/type of test: MRI   Date of test: 07/25  Was test done at a location other than [facility] (Please fill in the location if not [facility])?: No      Action Taken: Message routed to:  [facility]

## 2019-09-30 ENCOUNTER — HEALTH MAINTENANCE LETTER (OUTPATIENT)
Age: 49
End: 2019-09-30

## 2019-11-14 DIAGNOSIS — G35 MULTIPLE SCLEROSIS (H): ICD-10-CM

## 2019-11-14 RX ORDER — DIMETHYL FUMARATE 240 MG/1
240 CAPSULE ORAL 2 TIMES DAILY
Qty: 60 CAPSULE | Refills: 11 | Status: SHIPPED | OUTPATIENT
Start: 2019-11-14 | End: 2020-09-24

## 2019-11-14 NOTE — TELEPHONE ENCOUNTER
Received refill request for Tecfidera from [facility]; Patient was last seen on 7/02/2019 and has follow up appointment on 1/7/2020 with Dr Nettles. Pended to MS pool for review/approval    Shira Franco MA

## 2019-11-18 DIAGNOSIS — G35 MULTIPLE SCLEROSIS (H): ICD-10-CM

## 2019-11-18 RX ORDER — DIMETHYL FUMARATE 240 MG/1
CAPSULE ORAL
Qty: 60 CAPSULE | Refills: 0 | OUTPATIENT
Start: 2019-11-18

## 2019-11-18 NOTE — TELEPHONE ENCOUNTER
Duplicate Rx Authorization:  This medication request is not due for refills    Requested Medication/ Dose dimethyl fumarate (TECFIDERA) 240 MG CPDR    Date last refill ordered: 11/14/19    Quantity ordered: 60    # refills: 11  Contacted Pharmacy: No

## 2019-11-18 NOTE — TELEPHONE ENCOUNTER
Received refill request for Tecfidera; This prescription was sent last week on 11/14/19 for 1 year; This is a duplicate request.    Sheela Mathews MS RN Care Coordinator

## 2020-01-07 ENCOUNTER — OFFICE VISIT (OUTPATIENT)
Dept: NEUROLOGY | Facility: CLINIC | Age: 50
End: 2020-01-07
Attending: PSYCHIATRY & NEUROLOGY
Payer: COMMERCIAL

## 2020-01-07 VITALS
HEART RATE: 75 BPM | BODY MASS INDEX: 21.61 KG/M2 | WEIGHT: 126.6 LBS | DIASTOLIC BLOOD PRESSURE: 65 MMHG | HEIGHT: 64 IN | SYSTOLIC BLOOD PRESSURE: 97 MMHG

## 2020-01-07 DIAGNOSIS — G35 MS (MULTIPLE SCLEROSIS) (H): Primary | ICD-10-CM

## 2020-01-07 PROCEDURE — G0463 HOSPITAL OUTPT CLINIC VISIT: HCPCS

## 2020-01-07 ASSESSMENT — PAIN SCALES - GENERAL: PAINLEVEL: NO PAIN (0)

## 2020-01-07 ASSESSMENT — MIFFLIN-ST. JEOR: SCORE: 1194.25

## 2020-01-07 NOTE — LETTER
RE: Ewa Salter  [address] [place] [address]       Service Date: 01/07/2020      REASON FOR VISIT:  Ewa Salter is a 49-year-old woman who I follow for multiple sclerosis.  She returns for regular followup.  I last saw her in 01/2019.      HISTORY OF PRESENT ILLNESS:  When I saw Ewa last summer she had been having some symptoms in the context of GI problems that had been directly or indirectly attributed to her Crohn disease.  She had pouchitis as well as pancreatitis, perhaps related to the azathioprine she was taking as well as bowel urgency, cramping, etc.  In the context of all that, she had some subtle worsening of her balance, more numbness in the limbs, etc.  Ultimately, the Imuran was permanently discontinued.  The biggest improvement has come from starting [facility] who have found her to have pelvic floor insufficiency.  She is starting biofeedback for that this month and will be seeing a gynecologist specializing in pelvic floor dysfunction.  She is really feeling quite fine neurologically.  Her main residual symptom is mildly impaired balance and she is working on that at the gym.  She has very mild numbness in both hands.  She continues to do well from a cognitive perspective.  She and her epidemiology group submitted 10 grants yesterday, which is very impressive.  She continues to take the Tecfidera and tolerates it fine without flushing.  She has not had any significant new non-neurologic medical issues in the interim other than described above.      Her last lymphocyte count 1 year ago was 1.6.      PHYSICAL EXAMINATION:   VITAL SIGNS:  Blood pressure 97/65.  Pulse 75.  Weight 126 pounds.   GENERAL:  She is alert and oriented.  Affect is bright and language functions are normal.     NEUROLOGIC:  Cranial nerves are unremarkable.  Muscle bulk, tone, strength and dexterity are normal in the arms and legs.  Light touch is intact in the hands and feet.   Finger tapping, toe tapping and finger-nose-finger are normal.  Reflexes are normal and symmetric and her gait is narrow-based and stable.      We reviewed together her MRI done last July and compared it to the prior exam from 2018.  Again seen is a mild to moderate burden of T2 hyperintense lesions in the cerebral white matter with a size, shape and distribution typical for MS.  There were no new lesions compared to the prior exam.      IMPRESSION:  Relapsing remitting multiple sclerosis, clinically and radiologically stable on dimethyl fumarate.      I spent 31 minutes with Ewa, greater than 50% of which was spent in counseling and coordination of care and going over her MRIs together.  It is good that she is feeling better and has a plan to help with her pelvic issues.  We discussed safety monitoring with dimethyl fumarate and I told her that I think monitoring the lymphocyte counts at least once every couple of years is probably sufficient.  I do not routinely check LUANNE virus antibodies with that medication in the absence of lymphopenia, but we could do that if she likes.  We discussed the natural history of MS and I reassured her that I see no evidence to date of her transitioning into the progressive stage of the disease.      PLAN:  Return to clinic in 1 year.      Levy Nettles MD       D: 2020   T: 2020   MT: SPENCER      Name:     EWA LOCO   MRN:      [id number]        Account:      [id number]   :      1970           Service Date: 2020      Document: [id number]

## 2020-01-07 NOTE — PROGRESS NOTES
Service Date: 01/07/2020      REASON FOR VISIT:  Ewa Salter is a 49-year-old woman who I follow for multiple sclerosis.  She returns for regular followup.  I last saw her in 01/2019.      HISTORY OF PRESENT ILLNESS:  When I saw Ewa last summer she had been having some symptoms in the context of GI problems that had been directly or indirectly attributed to her Crohn disease.  She had pouchitis as well as pancreatitis, perhaps related to the azathioprine she was taking as well as bowel urgency, cramping, etc.  In the context of all that, she had some subtle worsening of her balance, more numbness in the limbs, etc.  Ultimately, the Imuran was permanently discontinued.  The biggest improvement has come from starting work with [facility] who have found her to have pelvic floor insufficiency.  She is starting biofeedback for that this month and will be seeing a gynecologist specializing in pelvic floor dysfunction.  She is really feeling quite fine neurologically.  Her main residual symptom is mildly impaired balance and she is working on that at the gym.  She has very mild numbness in both hands.  She continues to do well from a cognitive perspective.  She and her epidemiology group submitted 10 grants yesterday, which is very impressive.  She continues to take the Tecfidera and tolerates it fine without flushing.  She has not had any significant new non-neurologic medical issues in the interim other than described above.      Her last lymphocyte count 1 year ago was 1.6.      PHYSICAL EXAMINATION:   VITAL SIGNS:  Blood pressure 97/65.  Pulse 75.  Weight 126 pounds.   GENERAL:  She is alert and oriented.  Affect is bright and language functions are normal.     NEUROLOGIC:  Cranial nerves are unremarkable.  Muscle bulk, tone, strength and dexterity are normal in the arms and legs.  Light touch is intact in the hands and feet.  Finger tapping, toe tapping and finger-nose-finger are normal.  Reflexes are  normal and symmetric and her gait is narrow-based and stable.      We reviewed together her MRI done last July and compared it to the prior exam from 2018.  Again seen is a mild to moderate burden of T2 hyperintense lesions in the cerebral white matter with a size, shape and distribution typical for MS.  There were no new lesions compared to the prior exam.      IMPRESSION:  Relapsing remitting multiple sclerosis, clinically and radiologically stable on dimethyl fumarate.      I spent 31 minutes with Ewa, greater than 50% of which was spent in counseling and coordination of care and going over her MRIs together.  It is good that she is feeling better and has a plan to help with her pelvic issues.  We discussed safety monitoring with dimethyl fumarate and I told her that I think monitoring the lymphocyte counts at least once every couple of years is probably sufficient.  I do not routinely check LUANNE virus antibodies with that medication in the absence of lymphopenia, but we could do that if she likes.  We discussed the natural history of MS and I reassured her that I see no evidence to date of her transitioning into the progressive stage of the disease.      PLAN:  Return to clinic in 1 year.      MD YAMILA Haro MD             D: 2020   T: 2020   MT: SPENCER      Name:     EWA LOCO   MRN:      [id number]        Account:      [id number]   :      1970           Service Date: 2020      Document: [id number]

## 2020-09-24 DIAGNOSIS — G35 MULTIPLE SCLEROSIS (H): ICD-10-CM

## 2020-09-24 RX ORDER — DIMETHYL FUMARATE 240 MG/1
CAPSULE ORAL
Qty: 60 CAPSULE | Refills: 11 | Status: SHIPPED | OUTPATIENT
Start: 2020-09-24 | End: 2021-05-12 | Stop reason: ALTCHOICE

## 2020-09-24 NOTE — TELEPHONE ENCOUNTER
Received refill request for Tecfidera from [facility]; Patient was last seen in January and has follow up appointment in January 2021 with Dr. Nettles; Refilled for 1 year per [facility] refill protocol.    Sheela Mathews MS RN Care Coordinator

## 2020-12-09 ENCOUNTER — TRANSFERRED RECORDS (OUTPATIENT)
Dept: HEALTH INFORMATION MANAGEMENT | Facility: CLINIC | Age: 50
End: 2020-12-09

## 2020-12-15 NOTE — TELEPHONE ENCOUNTER
DIAGNOSIS: Ref by Dr. Reyez from [facility] for Rt Leg lump that needs to be excised.  Appt ok'd with Perla   APPOINTMENT DATE: 12/18/2020   NOTES STATUS DETAILS   OFFICE NOTE from referring provider Care Everywhere 12/03/2020  [facility]   OFFICE NOTE from other specialist Care Everywhere 01/27/2020  [facility]   DISCHARGE SUMMARY from hospital N/A    DISCHARGE REPORT from the ER N/A    OPERATIVE REPORT N/A    MEDICATION LIST Care Everywhere    EMG (for Spine) N/A    IMPLANT RECORD/STICKER N/A    LABS     CBC/DIFF Care Everywhere 11/30/2020   CULTURES N/A    INJECTIONS DONE IN RADIOLOGY N/A    MRI Received 12/09/2020   CT SCAN N/A    XRAYS (IMAGES & REPORTS) N/A    TUMOR     PATHOLOGY  Slides & report N/A    12/18/20   7:39 AM   so the MRI was done at [facility], she said she would push it but apparently they have been having issues getting images to us because of the [facility] merge affecting the PACS system. I have checked PACS but there is nothing there.  Carrie Peña, CMA

## 2020-12-18 ENCOUNTER — RECORDS - HEALTHEAST (OUTPATIENT)
Dept: RADIOLOGY | Facility: CLINIC | Age: 50
End: 2020-12-18

## 2020-12-18 ENCOUNTER — OFFICE VISIT (OUTPATIENT)
Dept: ORTHOPEDICS | Facility: CLINIC | Age: 50
End: 2020-12-18
Payer: COMMERCIAL

## 2020-12-18 ENCOUNTER — PRE VISIT (OUTPATIENT)
Dept: ORTHOPEDICS | Facility: CLINIC | Age: 50
End: 2020-12-18

## 2020-12-18 VITALS — HEIGHT: 65 IN | BODY MASS INDEX: 20.83 KG/M2 | WEIGHT: 125 LBS

## 2020-12-18 DIAGNOSIS — D17.9 INTRAMUSCULAR LIPOMA: Primary | ICD-10-CM

## 2020-12-18 PROCEDURE — 99203 OFFICE O/P NEW LOW 30 MIN: CPT | Performed by: ORTHOPAEDIC SURGERY

## 2020-12-18 ASSESSMENT — MIFFLIN-ST. JEOR: SCORE: 1197.26

## 2020-12-18 NOTE — NURSING NOTE
"Reason For Visit:   Chief Complaint   Patient presents with     Consult     consulting right leg lump referred by Dr. Reyez from [facility] // noticed mass beginning of November        Ht 1.65 m (5' 4.96\")   Wt 56.7 kg (125 lb)   BMI 20.83 kg/m      Pain Assessment  Patient Currently in Pain: No(no pain per pt)    Kae Moscoso ATC    "

## 2020-12-18 NOTE — LETTER
12/18/2020         RE: Ewa Salter  [address] [place] [address]        Dear Colleague,    Thank you for referring your patient, Ewa Salter, to the [facility] [place]. Please see a copy of my visit note below.    This 50-year-old woman noticed to anterolateral right thigh mass just over a month ago.  She has not noticed any pain here.  She has a history of ulcerative colitis and left meniscal tear.  I reviewed her patient survey information in the EMR.  The patient also provided a written summary of her surgeries and major medical diagnoses.    On examination she is alert oriented has normal mood and affect and is in no acute distress.  Respirations are regular and unlabored eyes are nonicteric.  In her right lower extremity there is no edema or erythema.  She has full motion of the hip knee and ankle.  She has a normal gait.  She has a palpable nontender mass within the vastus lateralis just above the knee.  Grossly she has a well-perfused leg and is intact neurologically.    Reviewed her MRI which shows a benign fatty deposit with some minor stranding within it consistent with an intramuscular lipoma.  This could also be an atypical lipoma but there is no overt signs of malignancy.    Given that this is a new finding and it could be an atypical lipoma have recommended an excision.  The patient understands that this will cause some muscle soreness with the expectation that she will recover fully.  Atypical lipomas would require increased vigilance afterward as there is some risk of recurrence and malignant transformation.  This patient understands and will plan on getting this taken care of soon.  I have answered all of her questions.    Sincerely,        Sang Concepcion MD

## 2020-12-21 ENCOUNTER — TELEPHONE (OUTPATIENT)
Dept: ORTHOPEDICS | Facility: CLINIC | Age: 50
End: 2020-12-21

## 2020-12-21 PROBLEM — D17.9 INTRAMUSCULAR LIPOMA: Status: ACTIVE | Noted: 2020-12-21

## 2020-12-21 NOTE — TELEPHONE ENCOUNTER
RN called and left a voice message or Ewa.  She is to call us back to confirm that surgery date offered.  She will also ask about surgery location for covid,.  She is being sent a surgery packet in the mail, she was asked to review and call if she had any questions.  She is aware of [facility] call as well.    Patient is scheduled for surgery with Dr. Concepcion        Spoke or left message with: spoke to ERIC Andersen - RN scheduled with patient     Date of Surgery: 1/28/2021     Location: [facility]     Informed patient they will need an adult  yes     Pre-op with surgeon (if applicable): n/a     H&P: Scheduled with PAC on 1/7/2021 (video visit)     POP: 2/17/2021 @ 7:30a (video)     Additional imaging/appointments: n/a     Surgery packet: Mailed on 12/21/2020      Additional comments: COVID test scheduled at  [facility] on 1/25/2021 @ 11am

## 2020-12-21 NOTE — TELEPHONE ENCOUNTER
Patient is scheduled for surgery with Dr. Concepcion      Spoke or left message with: spoke to ERIC Andersen - RN scheduled with patient    Date of Surgery: 1/28/2021    Location: [facility]    Informed patient they will need an adult  yes    Pre-op with surgeon (if applicable): n/a    H&P: Scheduled with PAC on 1/7/2021 (video visit)    POP: 2/17/2021 @ 7:30a (video)    Additional imaging/appointments: n/a    Surgery packet: Mailed on 12/21/2020     Additional comments: COVID test scheduled at  [facility] on 1/25/2021 @ 11am

## 2020-12-22 NOTE — TELEPHONE ENCOUNTER
FUTURE VISIT INFORMATION      SURGERY INFORMATION:    Date: 1/28/21    [facility]    Surgeon:  Sang Concepcion MD    Anesthesia Type:  general    Procedure: Excision right thigh mass    Consult: ov 12/18    RECORDS REQUESTED FROM:       Primary Care Provider: Tesfaye Reyez MD - [facility]    Most recent EKG+ Tracing: 10/26/2009- [facility]

## 2021-01-04 ENCOUNTER — TELEPHONE (OUTPATIENT)
Dept: NEUROLOGY | Facility: CLINIC | Age: 51
End: 2021-01-04

## 2021-01-04 DIAGNOSIS — G35 MS (MULTIPLE SCLEROSIS) (H): Primary | ICD-10-CM

## 2021-01-04 NOTE — TELEPHONE ENCOUNTER
[facility] Call Center    Phone Message    May a detailed message be left on voicemail: yes     Reason for Call: Other: Writer can see no active MRI order from Dr. Nettles.  Please review and call pt back to let her know IF an MRI needs to be ordered and completed before pt's upcomning 2/9/21 appt at 9:30am with Dr. Nettles. Thank you.    Action Taken: Message routed to:  [facility] ([facility]    Travel Screening: Not Applicable

## 2021-01-07 ENCOUNTER — ANESTHESIA EVENT (OUTPATIENT)
Dept: SURGERY | Facility: CLINIC | Age: 51
End: 2021-01-07

## 2021-01-07 ENCOUNTER — VIRTUAL VISIT (OUTPATIENT)
Dept: SURGERY | Facility: CLINIC | Age: 51
End: 2021-01-07
Payer: COMMERCIAL

## 2021-01-07 ENCOUNTER — PRE VISIT (OUTPATIENT)
Dept: SURGERY | Facility: CLINIC | Age: 51
End: 2021-01-07

## 2021-01-07 VITALS — WEIGHT: 128 LBS | BODY MASS INDEX: 21.33 KG/M2 | HEIGHT: 65 IN

## 2021-01-07 DIAGNOSIS — Z01.818 PREOP EXAMINATION: Primary | ICD-10-CM

## 2021-01-07 PROCEDURE — 99202 OFFICE O/P NEW SF 15 MIN: CPT | Mod: GT | Performed by: PHYSICIAN ASSISTANT

## 2021-01-07 ASSESSMENT — ENCOUNTER SYMPTOMS: SEIZURES: 0

## 2021-01-07 ASSESSMENT — MIFFLIN-ST. JEOR: SCORE: 1211.48

## 2021-01-07 ASSESSMENT — PAIN SCALES - GENERAL: PAINLEVEL: NO PAIN (0)

## 2021-01-07 ASSESSMENT — LIFESTYLE VARIABLES: TOBACCO_USE: 0
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
## 2021-01-07 NOTE — PROGRESS NOTES
Ewa Salter is a 50 year old female who is being evaluated via a billable video visit.      How would you like to obtain your AVS? Mail a copy  If the video visit is dropped, the invitation should be resent by: Send to e-mail at: [email or web address]  Will anyone else be joining your video visit? No      HPI   Review of Systems     Physical Exam
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
## 2021-01-07 NOTE — PATIENT INSTRUCTIONS
Preparing for Your Surgery      Name:  Ewa Salter   MRN:  [id number]   :  1970   Today's Date:  2021         Arriving for surgery:  Surgery date:  21  Arrival time:  1:15 pm    Restrictions due to COVID 19:  No visitors are allowed at this time.         parking is available for anyone with mobility limitations or disabilities. (Monday- Friday 7 am- 5 pm)    Please come to:    [facility]  [address] [place] [id number]    Check in on the 5th floor, Ambulatory Surgery Center.    What can I eat or drink?    -  You may eat and drink normally until 8 hours before surgery. (Until 6:45 am)  -  You may have clear liquids up to 4 hours before surgery. (Until 10:45 am)    Examples of clear liquids:  Water  Clear broth  Juices (apple, white grape, white cranberry  and cider) without pulp  Noncarbonated, powder based beverages  (lemonade and Bonilla-Aid)  Sodas (Sprite, 7-Up, ginger ale and seltzer)  Coffee or tea (without milk or cream)  Gatorade    --No alcohol for at least 24 hours before surgery    Which medicines can I take?    Hold Aspirin for 7 days before surgery.   Hold Multivitamins for 7 days before surgery.  Hold Supplements for 7 days before surgery.  Hold Ibuprofen (Advil, Motrin) for 1 day before surgery.  Hold Naproxen (Aleve) for 4 days before surgery.    **We will contact you with a plan for Tecfidera.**        How do I prepare myself?  - Please take 2 showers before surgery using Scrubcare or Hibiclens soap.    Use this soap only from the neck to your toes.     Leave the soap on your skin for one minute--then rinse thoroughly.      You may use your own shampoo and conditioner; no other hair products.   - Please remove all jewelry and body piercings.  - No lotions, deodorants or fragrance.  - No makeup or fingernail polish.   - Bring your ID and insurance card.        - All patients are required to have a Covid-19 test within 4 days of  surgery/procedure.      -Patients will be contacted by the [facility] scheduling team within 1 week of surgery to make an appointment.      - Patients may call the Scheduling team at [phone] if they have not been scheduled within 4 days of  surgery.      ALL PATIENTS ARE REQUIRED TO HAVE A RESPONSIBLE ADULT TO DRIVE AND BE IN ATTENDANCE WITH THEM FOR 24 HOURS FOLLOWING SURGERY       Questions or Concerns:    -For questions regarding the day of surgery please contact the Ambulatory Surgery Center at [phone].    -If you have health changes between today and your surgery please contact your surgeon.     For questions after surgery please call your surgeons office.

## 2021-01-08 DIAGNOSIS — Z11.59 ENCOUNTER FOR SCREENING FOR OTHER VIRAL DISEASES: Primary | ICD-10-CM

## 2021-01-11 NOTE — TELEPHONE ENCOUNTER
Dr. Nettles ordered a brain MRI for the patient; I called the patient and left [facility] for her with the phone number to get the MRI scheduled.    Sheela Mathews, MS RN Care Coordinator

## 2021-01-15 ENCOUNTER — HEALTH MAINTENANCE LETTER (OUTPATIENT)
Age: 51
End: 2021-01-15

## 2021-01-23 ENCOUNTER — HEALTH MAINTENANCE LETTER (OUTPATIENT)
Age: 51
End: 2021-01-23

## 2021-01-25 ENCOUNTER — OFFICE VISIT (OUTPATIENT)
Dept: LAB | Facility: CLINIC | Age: 51
End: 2021-01-25
Payer: COMMERCIAL

## 2021-01-25 DIAGNOSIS — Z11.59 ENCOUNTER FOR SCREENING FOR OTHER VIRAL DISEASES: ICD-10-CM

## 2021-01-25 LAB
SARS-COV-2 RNA RESP QL NAA+PROBE: NORMAL
SPECIMEN SOURCE: NORMAL

## 2021-01-25 PROCEDURE — 87635 SARS-COV-2 COVID-19 AMP PRB: CPT | Performed by: ORTHOPAEDIC SURGERY

## 2021-01-26 LAB
LABORATORY COMMENT REPORT: NORMAL
SARS-COV-2 RNA RESP QL NAA+PROBE: NEGATIVE
SPECIMEN SOURCE: NORMAL

## 2021-01-27 ENCOUNTER — ANESTHESIA EVENT (OUTPATIENT)
Dept: SURGERY | Facility: AMBULATORY SURGERY CENTER | Age: 51
End: 2021-01-27

## 2021-01-28 ENCOUNTER — HOSPITAL ENCOUNTER (OUTPATIENT)
Facility: AMBULATORY SURGERY CENTER | Age: 51
Discharge: HOME | End: 2021-01-28
Attending: ORTHOPAEDIC SURGERY | Admitting: ORTHOPAEDIC SURGERY
Payer: COMMERCIAL

## 2021-01-28 ENCOUNTER — ANESTHESIA (OUTPATIENT)
Dept: SURGERY | Facility: AMBULATORY SURGERY CENTER | Age: 51
End: 2021-01-28

## 2021-01-28 VITALS
WEIGHT: 126 LBS | TEMPERATURE: 98 F | HEIGHT: 64 IN | SYSTOLIC BLOOD PRESSURE: 105 MMHG | DIASTOLIC BLOOD PRESSURE: 63 MMHG | HEART RATE: 59 BPM | RESPIRATION RATE: 16 BRPM | BODY MASS INDEX: 21.51 KG/M2 | OXYGEN SATURATION: 100 %

## 2021-01-28 DIAGNOSIS — D17.9 INTRAMUSCULAR LIPOMA: ICD-10-CM

## 2021-01-28 LAB
HCG UR QL: NEGATIVE
INTERNAL QC OK POCT: YES

## 2021-01-28 PROCEDURE — 27339 EXC THIGH/KNEE TUM DEP 5CM/>: CPT | Mod: RT

## 2021-01-28 PROCEDURE — 88304 TISSUE EXAM BY PATHOLOGIST: CPT | Mod: 26 | Performed by: PATHOLOGY

## 2021-01-28 PROCEDURE — 81025 URINE PREGNANCY TEST: CPT | Performed by: PATHOLOGY

## 2021-01-28 RX ORDER — ONDANSETRON 4 MG/1
4 TABLET, ORALLY DISINTEGRATING ORAL EVERY 30 MIN PRN
Status: DISCONTINUED | OUTPATIENT
Start: 2021-01-28 | End: 2021-01-29 | Stop reason: HOSPADM

## 2021-01-28 RX ORDER — FENTANYL CITRATE 50 UG/ML
25-50 INJECTION, SOLUTION INTRAMUSCULAR; INTRAVENOUS
Status: DISCONTINUED | OUTPATIENT
Start: 2021-01-28 | End: 2021-01-29 | Stop reason: HOSPADM

## 2021-01-28 RX ORDER — CEFAZOLIN SODIUM 1 G/50ML
1 SOLUTION INTRAVENOUS SEE ADMIN INSTRUCTIONS
Status: DISCONTINUED | OUTPATIENT
Start: 2021-01-28 | End: 2021-01-28 | Stop reason: HOSPADM

## 2021-01-28 RX ORDER — ALBUTEROL SULFATE 0.83 MG/ML
2.5 SOLUTION RESPIRATORY (INHALATION) EVERY 4 HOURS PRN
Status: DISCONTINUED | OUTPATIENT
Start: 2021-01-28 | End: 2021-01-29 | Stop reason: HOSPADM

## 2021-01-28 RX ORDER — DEXAMETHASONE SODIUM PHOSPHATE 4 MG/ML
INJECTION, SOLUTION INTRA-ARTICULAR; INTRALESIONAL; INTRAMUSCULAR; INTRAVENOUS; SOFT TISSUE PRN
Status: DISCONTINUED | OUTPATIENT
Start: 2021-01-28 | End: 2021-01-28

## 2021-01-28 RX ORDER — MEPERIDINE HYDROCHLORIDE 25 MG/ML
12.5 INJECTION INTRAMUSCULAR; INTRAVENOUS; SUBCUTANEOUS
Status: DISCONTINUED | OUTPATIENT
Start: 2021-01-28 | End: 2021-01-29 | Stop reason: HOSPADM

## 2021-01-28 RX ORDER — NALOXONE HYDROCHLORIDE 0.4 MG/ML
0.4 INJECTION, SOLUTION INTRAMUSCULAR; INTRAVENOUS; SUBCUTANEOUS
Status: DISCONTINUED | OUTPATIENT
Start: 2021-01-28 | End: 2021-01-29 | Stop reason: HOSPADM

## 2021-01-28 RX ORDER — NALOXONE HYDROCHLORIDE 0.4 MG/ML
0.2 INJECTION, SOLUTION INTRAMUSCULAR; INTRAVENOUS; SUBCUTANEOUS
Status: DISCONTINUED | OUTPATIENT
Start: 2021-01-28 | End: 2021-01-29 | Stop reason: HOSPADM

## 2021-01-28 RX ORDER — OXYCODONE HYDROCHLORIDE 5 MG/1
5 TABLET ORAL EVERY 6 HOURS PRN
Qty: 10 TABLET | Refills: 0 | Status: SHIPPED | OUTPATIENT
Start: 2021-01-28 | End: 2021-08-17

## 2021-01-28 RX ORDER — SODIUM CHLORIDE, SODIUM LACTATE, POTASSIUM CHLORIDE, CALCIUM CHLORIDE 600; 310; 30; 20 MG/100ML; MG/100ML; MG/100ML; MG/100ML
INJECTION, SOLUTION INTRAVENOUS CONTINUOUS
Status: DISCONTINUED | OUTPATIENT
Start: 2021-01-28 | End: 2021-01-28 | Stop reason: HOSPADM

## 2021-01-28 RX ORDER — PROPOFOL 10 MG/ML
INJECTION, EMULSION INTRAVENOUS PRN
Status: DISCONTINUED | OUTPATIENT
Start: 2021-01-28 | End: 2021-01-28

## 2021-01-28 RX ORDER — ONDANSETRON 2 MG/ML
INJECTION INTRAMUSCULAR; INTRAVENOUS PRN
Status: DISCONTINUED | OUTPATIENT
Start: 2021-01-28 | End: 2021-01-28

## 2021-01-28 RX ORDER — LIDOCAINE 40 MG/G
CREAM TOPICAL
Status: DISCONTINUED | OUTPATIENT
Start: 2021-01-28 | End: 2021-01-28 | Stop reason: HOSPADM

## 2021-01-28 RX ORDER — CEFAZOLIN SODIUM 2 G/50ML
2 SOLUTION INTRAVENOUS
Status: COMPLETED | OUTPATIENT
Start: 2021-01-28 | End: 2021-01-28

## 2021-01-28 RX ORDER — PROPOFOL 10 MG/ML
INJECTION, EMULSION INTRAVENOUS CONTINUOUS PRN
Status: DISCONTINUED | OUTPATIENT
Start: 2021-01-28 | End: 2021-01-28

## 2021-01-28 RX ORDER — SODIUM CHLORIDE, SODIUM LACTATE, POTASSIUM CHLORIDE, CALCIUM CHLORIDE 600; 310; 30; 20 MG/100ML; MG/100ML; MG/100ML; MG/100ML
INJECTION, SOLUTION INTRAVENOUS CONTINUOUS
Status: DISCONTINUED | OUTPATIENT
Start: 2021-01-28 | End: 2021-01-29 | Stop reason: HOSPADM

## 2021-01-28 RX ORDER — BUPIVACAINE HYDROCHLORIDE 2.5 MG/ML
INJECTION, SOLUTION INFILTRATION; PERINEURAL PRN
Status: DISCONTINUED | OUTPATIENT
Start: 2021-01-28 | End: 2021-01-28 | Stop reason: HOSPADM

## 2021-01-28 RX ORDER — LIDOCAINE HYDROCHLORIDE 20 MG/ML
INJECTION, SOLUTION INFILTRATION; PERINEURAL PRN
Status: DISCONTINUED | OUTPATIENT
Start: 2021-01-28 | End: 2021-01-28

## 2021-01-28 RX ORDER — ACETAMINOPHEN 325 MG/1
975 TABLET ORAL ONCE
Status: COMPLETED | OUTPATIENT
Start: 2021-01-28 | End: 2021-01-28

## 2021-01-28 RX ORDER — OXYCODONE HYDROCHLORIDE 5 MG/1
5 TABLET ORAL EVERY 4 HOURS PRN
Status: DISCONTINUED | OUTPATIENT
Start: 2021-01-28 | End: 2021-01-29 | Stop reason: HOSPADM

## 2021-01-28 RX ORDER — ONDANSETRON 2 MG/ML
4 INJECTION INTRAMUSCULAR; INTRAVENOUS EVERY 30 MIN PRN
Status: DISCONTINUED | OUTPATIENT
Start: 2021-01-28 | End: 2021-01-29 | Stop reason: HOSPADM

## 2021-01-28 RX ORDER — FENTANYL CITRATE 50 UG/ML
INJECTION, SOLUTION INTRAMUSCULAR; INTRAVENOUS PRN
Status: DISCONTINUED | OUTPATIENT
Start: 2021-01-28 | End: 2021-01-28

## 2021-01-28 RX ORDER — GABAPENTIN 300 MG/1
300 CAPSULE ORAL ONCE
Status: COMPLETED | OUTPATIENT
Start: 2021-01-28 | End: 2021-01-28

## 2021-01-28 RX ADMIN — FENTANYL CITRATE 25 MCG: 50 INJECTION, SOLUTION INTRAMUSCULAR; INTRAVENOUS at 14:52

## 2021-01-28 RX ADMIN — LIDOCAINE HYDROCHLORIDE 60 MG: 20 INJECTION, SOLUTION INFILTRATION; PERINEURAL at 13:51

## 2021-01-28 RX ADMIN — FENTANYL CITRATE 50 MCG: 50 INJECTION, SOLUTION INTRAMUSCULAR; INTRAVENOUS at 14:48

## 2021-01-28 RX ADMIN — FENTANYL CITRATE 25 MCG: 50 INJECTION, SOLUTION INTRAMUSCULAR; INTRAVENOUS at 14:43

## 2021-01-28 RX ADMIN — PROPOFOL 150 MG: 10 INJECTION, EMULSION INTRAVENOUS at 13:51

## 2021-01-28 RX ADMIN — CEFAZOLIN SODIUM 2 G: 2 SOLUTION INTRAVENOUS at 14:00

## 2021-01-28 RX ADMIN — OXYCODONE HYDROCHLORIDE 5 MG: 5 TABLET ORAL at 14:56

## 2021-01-28 RX ADMIN — SODIUM CHLORIDE, SODIUM LACTATE, POTASSIUM CHLORIDE, CALCIUM CHLORIDE: 600; 310; 30; 20 INJECTION, SOLUTION INTRAVENOUS at 13:25

## 2021-01-28 RX ADMIN — SODIUM CHLORIDE, SODIUM LACTATE, POTASSIUM CHLORIDE, CALCIUM CHLORIDE: 600; 310; 30; 20 INJECTION, SOLUTION INTRAVENOUS at 13:45

## 2021-01-28 RX ADMIN — ONDANSETRON 4 MG: 2 INJECTION INTRAMUSCULAR; INTRAVENOUS at 14:02

## 2021-01-28 RX ADMIN — PROPOFOL 150 MCG/KG/MIN: 10 INJECTION, EMULSION INTRAVENOUS at 13:51

## 2021-01-28 RX ADMIN — FENTANYL CITRATE 50 MCG: 50 INJECTION, SOLUTION INTRAMUSCULAR; INTRAVENOUS at 13:51

## 2021-01-28 RX ADMIN — ACETAMINOPHEN 975 MG: 325 TABLET ORAL at 13:06

## 2021-01-28 RX ADMIN — GABAPENTIN 300 MG: 300 CAPSULE ORAL at 13:07

## 2021-01-28 RX ADMIN — DEXAMETHASONE SODIUM PHOSPHATE 4 MG: 4 INJECTION, SOLUTION INTRA-ARTICULAR; INTRALESIONAL; INTRAMUSCULAR; INTRAVENOUS; SOFT TISSUE at 14:02

## 2021-01-28 ASSESSMENT — MIFFLIN-ST. JEOR: SCORE: 1186.53
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
## 2021-01-28 NOTE — DISCHARGE INSTRUCTIONS
[facility]  Home Care Following Anesthesia  For 24 hours after surgery:  1. Get plenty of rest.  A responsible adult must stay with you for at least 24 hours after you leave the surgery center.  2. Do not drive or use heavy equipment.  If you have weakness or tingling, don't drive or use heavy equipment until this feeling goes away.   3. Do not drink alcohol.   4. Avoid strenuous or risky activities.  Ask for help when climbing stairs.  5. You may feel lightheaded.  IF so, sit for a few minutes before standing.  Have someone help you get up.   6. If you have nausea (feel sick to your stomach): Drink only clear liquids such as apple juice, ginger ale, broth or 7-Up.  Rest may also help.  Be sure to drink enough fluids.  Move to a regular diet as you feel able.   7. You may have a slight fever.  Call the doctor if your fever is over 100 F (37.7 C) (taken under the tongue) or lasts longer than 24 hours.  8. You may have a dry mouth, a sore throat, muscle aches or trouble sleeping. These should go away after 24 hours.  9. Do not make important or legal decisions.               Tips for taking pain medications  To get the best pain relief possible, remember these points:    Take pain medications as directed, before pain becomes severe.    Pain medication can upset your stomach: taking it with food may help.    Constipation is a common side effect of pain medication. Drink plenty of  fluids.    Eat foods high in fiber. Take a stool softener if recommended by your doctor or pharmacist.    Do not drink alcohol, drive or operate machinery while taking pain medications.    Ask about other ways to control pain, such as with heat, ice or relaxation.    Tylenol/Acetaminophen Consumption  To help encourage the safe use of acetaminophen, the makers of TYLENOL  have lowered the maximum daily dose for single-ingredient Extra Strength TYLENOL  (acetaminophen) products sold in the [place] from 8  pills per day (4,000 mg) to 6 pills per day (3,000 mg). The dosing interval has also changed from 2 pills every 4-6 hours to 2 pills every 6 hours.    If you feel your pain relief is insufficient, you may take Tylenol/Acetaminophen in addition to your narcotic pain medication.     Be careful not to exceed 3,000 mg of Tylenol/Acetaminophen in a 24 hour period from all sources.    If you are taking extra strength Tylenol/acetaminophen (500 mg), the maximum dose is 6 tablets in 24 hours.    If you are taking regular strength acetaminophen (325 mg), the maximum dose is 9 tablets in 24 hours.    Call a doctor for any of the followin. Signs of infection (fever, growing tenderness at the surgery site, a large amount of drainage or bleeding, severe pain, foul-smelling drainage, redness, swelling).  2. It has been over 8 to 10 hours since surgery and you are still not able to urinate (pass water).  3. Headache for over 24 hours.  4. Numbness, tingling or weakness the day after surgery (if you had spinal anesthesia).  5. Signs of Covid-19 infection (temperature over 100 degrees, shortness of breath, cough, loss of taste/smell, generalized body aches, persistent headache, chills, sore throat, nausea/vomiting/diarrhea)  Your doctor is:       Dr. Sang Concepcion, Orthopaedics: [phone]               Or dial [phone] and ask for the resident on call for:  Orthopaedics  For emergency care, call the:  [facility] Emergency Department: [phone] (TTY for hearing impaired: [phone])

## 2021-01-28 NOTE — ANESTHESIA POSTPROCEDURE EVALUATION
Patient: Ewa Salter    Procedure(s):  Excision right thigh mass    Diagnosis:Intramuscular lipoma [D17.9]  Diagnosis Additional Information: No value filed.    Anesthesia Type:  General    Note:  Disposition: Outpatient   Postop Pain Control: Uneventful            Sign Out: Well controlled pain   PONV: No   Neuro/Psych: Uneventful            Sign Out: Acceptable/Baseline neuro status   Airway/Respiratory: Uneventful            Sign Out: Acceptable/Baseline resp. status   CV/Hemodynamics: Uneventful            Sign Out: Acceptable CV status   Other NRE: NONE   DID A NON-ROUTINE EVENT OCCUR? No         Last vitals:  Vitals:    01/28/21 1445 01/28/21 1500 01/28/21 1545   BP: 115/72 105/71 105/63   Pulse: 70 59    Resp: 15 18 16   Temp:  36.7  C (98.1  F) 36.7  C (98  F)   SpO2:          Electronically Signed By: Isaac Pascual MD  January 28, 2021  4:18 PM

## 2021-01-28 NOTE — OP NOTE
DATE OF SURGERY: 1/28/2021    PREOPERATIVE DIAGNOSIS: Right thigh mass    POSTOPERATIVE DIAGNOSIS: Right thigh intramuscular lipoma    PROCEDURE: Excision right thigh mass, deep, 5 cm    SURGEON: Sang Concepcion MD     ASSISTANT: Betsey Lopez MD    PATIENT HISTORY: This patient has a history of a growing right thigh mass.  She understands the risks of bleed infection pain numbness tingling weakness and limp.    DESCRIPTION OF PROCEDURE: The patient was placed supine and underwent successful induction of anesthesia.  The right leg was washed and sterilely prepped and draped.  We made an incision over the palpable mass sharply through skin divided subcutaneous tissue with cautery and then opened up the muscle fascia.  We carefully dissected bluntly through the muscle going between the muscle fibers and identified this fatty tumor.  It had some muscle fibers adherent to it which we divided.  These were quite small.  The mass was excised intact.  We then irrigated copiously.  We closed the fascia with Vicryl the subcutaneous tissue with Vicryl and the skin with Monocryl followed by Steri-Strips and a sterile dry dressing.  The patient was extubated and taken to the recovery room in stable condition.  The estimated blood loss is less than 3 mL.  I was present for all critical portions of the procedure.  The mass was sent to pathology in formalin.    Sang Concepcion MD

## 2021-01-28 NOTE — ANESTHESIA CARE TRANSFER NOTE
Patient: Ewa Salter    Procedure(s):  Excision right thigh mass    Diagnosis: Intramuscular lipoma [D17.9]  Diagnosis Additional Information: No value filed.    Anesthesia Type:   No value filed.     Note:      Level of Consciousness: awake  Oxygen Supplementation: room air    Independent Airway: airway patency satisfactory and stable  Dentition: dentition unchanged  Vital Signs Stable: post-procedure vital signs reviewed and stable  Report to RN Given: handoff report given  Patient transferred to: PACU    Handoff Report: Identifed the Patient, Identified the Reponsible Provider, Reviewed the pertinent medical history, Discussed the surgical course, Reviewed Intra-OP anesthesia mangement and issues during anesthesia, Set expectations for post-procedure period and Allowed opportunity for questions and acknowledgement of understanding      Vitals: (Last set prior to Anesthesia Care Transfer)  CRNA VITALS  1/28/2021 1401 - 1/28/2021 1433      1/28/2021             Pulse:  72    SpO2:  99 %    Resp Rate (observed):  8    Resp Rate (set):  10        Electronically Signed By: GUZAMN Oropeaz CRNA  January 28, 2021  2:33 PM

## 2021-02-04 LAB
COPATH REPORT: NORMAL
COPATH REPORT: NORMAL

## 2021-02-05 ENCOUNTER — ANCILLARY PROCEDURE (OUTPATIENT)
Dept: MRI IMAGING | Facility: CLINIC | Age: 51
End: 2021-02-05
Attending: PSYCHIATRY & NEUROLOGY
Payer: COMMERCIAL

## 2021-02-05 DIAGNOSIS — G35 MS (MULTIPLE SCLEROSIS) (H): ICD-10-CM

## 2021-02-05 PROCEDURE — 70551 MRI BRAIN STEM W/O DYE: CPT | Mod: GC | Performed by: RADIOLOGY

## 2021-02-09 ENCOUNTER — OFFICE VISIT (OUTPATIENT)
Dept: NEUROLOGY | Facility: CLINIC | Age: 51
End: 2021-02-09
Attending: PSYCHIATRY & NEUROLOGY
Payer: COMMERCIAL

## 2021-02-09 VITALS
SYSTOLIC BLOOD PRESSURE: 129 MMHG | HEIGHT: 64 IN | HEART RATE: 91 BPM | WEIGHT: 126 LBS | BODY MASS INDEX: 21.51 KG/M2 | DIASTOLIC BLOOD PRESSURE: 76 MMHG

## 2021-02-09 DIAGNOSIS — G35 MS (MULTIPLE SCLEROSIS) (H): Primary | ICD-10-CM

## 2021-02-09 DIAGNOSIS — K50.018 CROHN'S DISEASE OF SMALL INTESTINE WITH OTHER COMPLICATION (H): ICD-10-CM

## 2021-02-09 DIAGNOSIS — D17.9 INTRAMUSCULAR LIPOMA: ICD-10-CM

## 2021-02-09 PROCEDURE — 99215 OFFICE O/P EST HI 40 MIN: CPT | Performed by: PSYCHIATRY & NEUROLOGY

## 2021-02-09 PROCEDURE — G0463 HOSPITAL OUTPT CLINIC VISIT: HCPCS

## 2021-02-09 ASSESSMENT — PAIN SCALES - GENERAL: PAINLEVEL: NO PAIN (0)

## 2021-02-09 ASSESSMENT — MIFFLIN-ST. JEOR: SCORE: 1186.53

## 2021-02-09 NOTE — LETTER
2/9/2021      RE: Ewa Salter  [address] [place] [address]       Service Date: 02/09/2021      REASON FOR VISIT:  Ewa Salter is a 50-year-old woman who I follow for multiple sclerosis.  She returns for clinical followup and MRI review.      HISTORY OF PRESENT ILLNESS:  Ewa has been stable from an MS perspective, with no new symptoms suggestive of interim relapse or of disease progression.  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  She is recovering from that well.  Residual MS symptoms include some numbness in her hands and some very mild balance impairment.  She has been off of azathioprine for about a year and a half for her ulcerative colitis.  She continues on dimethyl fumarate and tolerates that okay.      PHYSICAL EXAMINATION:  Blood pressure 129/76, pulse 91, weight 126 pounds.  She is alert and oriented.  Affect is bright and language functions are normal.  Eye movements are full without diplopia or nystagmus.  There is no dysarthria or dysphonia.  Muscle bulk, tone, strength and dexterity is normal in the arms and legs.  Reflexes are normal and symmetric and her gait is narrow-based and stable with mildly impaired tandem walk and negative Romberg.      We reviewed together her MRI of the brain done earlier today and compared it to the prior exam from 07/2019.  There is a new left periventricular lesion and a small new lesion in the left subcortical white matter as well.  No contrast was given.      IMPRESSION:  Relapsing-remitting multiple sclerosis, clinically stable but with new MRI lesions while on dimethyl fumarate.        I spent 52 minutes in care for Ewa today including pre-visit chart review, face-to-face time, MRI review and documentation.  Mainly, we discussed the implications of these new lesions.  She was also wondering about any risk of cancer recurrence with the Tecfidera, given  the liposarcoma.     Regarding the latter, I told her that dimethyl fumarate is believed to work by modulating the Nrf-2 pathway, which has also been implicated in cancer.  It is a bit confusing because in some cancer models, dimethyl fumarate seems to be tumor suppressive whereas in others, it appears to promote tumor formation.  She has been taking it appropriately and the new lesions do clearly show that it is not being 100% effective at suppressing her MS.  We spent most of the time discussing alternatives.  Teriflunomide would be a reasonable option, except for her IBD as the 20%-25% risk of GI side effects would not be a good fit for her.   We discussed interferon as an option, such as Plegridy, but she was really not enthusiastic about going back to injectable treatment.  We never have checked her LUANNE virus as far as I know, at least not that she has actually gotten done (has been ordered in the past).        I discussed B-cell depletion as an option and went over that.  I would prefer to at the very least have her immunized against COVID-19 before starting down that road.  Ultimately, we decided to make no immediate changes and to continue the Tecfidera for now.  She has not had the insurance coverage issues that some patients have unfortunately been having with that medication and she tolerates it well.  We did discuss doing more frequent MRI surveillance and any new lesions would be a definite indication for switching.      PLAN:  Return in 6 months with repeat MRI brain.         YAMILA NOVAK MD             D: 2021   T: 2021   MT: RIA      Name:     MELY LOCO   MRN:      [id number]        Account:      [id number]   :      1970           Service Date: 2021      Document: [id number]

## 2021-02-09 NOTE — PROGRESS NOTES
Service Date: 02/09/2021      REASON FOR VISIT:  Ewa Salter is a 50-year-old woman who I follow for multiple sclerosis.  She returns for clinical followup and MRI review.      HISTORY OF PRESENT ILLNESS:  Ewa has been stable from an MS perspective, with no new symptoms suggestive of interim relapse or of disease progression.  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  She is recovering from that well.  Residual MS symptoms include some numbness in her hands and some very mild balance impairment.  She has been off of azathioprine for about a year and a half for her ulcerative colitis.  She continues on dimethyl fumarate and tolerates that okay.      PHYSICAL EXAMINATION:  Blood pressure 129/76, pulse 91, weight 126 pounds.  She is alert and oriented.  Affect is bright and language functions are normal.  Eye movements are full without diplopia or nystagmus.  There is no dysarthria or dysphonia.  Muscle bulk, tone, strength and dexterity is normal in the arms and legs.  Reflexes are normal and symmetric and her gait is narrow-based and stable with mildly impaired tandem walk and negative Romberg.      We reviewed together her MRI of the brain done earlier today and compared it to the prior exam from 07/2019.  There is a new left periventricular lesion and a small new lesion in the left subcortical white matter as well.  No contrast was given.      IMPRESSION:  Relapsing-remitting multiple sclerosis, clinically stable but with new MRI lesions while on dimethyl fumarate.        I spent 52 minutes in care for Ewa today including pre-visit chart review, face-to-face time, MRI review and documentation.  Mainly, we discussed the implications of these new lesions.  She was also wondering about any risk of cancer recurrence with the Tecfidera, given the liposarcoma.     Regarding the latter, I told her that dimethyl fumarate is  believed to work by modulating the Nrf-2 pathway, which has also been implicated in cancer.  It is a bit confusing because in some cancer models, dimethyl fumarate seems to be tumor suppressive whereas in others, it appears to promote tumor formation.  She has been taking it appropriately and the new lesions do clearly show that it is not being 100% effective at suppressing her MS.  We spent most of the time discussing alternatives.  Teriflunomide would be a reasonable option, except for her IBD as the 20%-25% risk of GI side effects would not be a good fit for her.   We discussed interferon as an option, such as Plegridy, but she was really not enthusiastic about going back to injectable treatment.  We never have checked her LUANNE virus as far as I know, at least not that she has actually gotten done (has been ordered in the past).        I discussed B-cell depletion as an option and went over that.  I would prefer to at the very least have her immunized against COVID-19 before starting down that road.  Ultimately, we decided to make no immediate changes and to continue the Tecfidera for now.  She has not had the insurance coverage issues that some patients have unfortunately been having with that medication and she tolerates it well.  We did discuss doing more frequent MRI surveillance and any new lesions would be a definite indication for switching.      PLAN:  Return in 6 months with repeat MRI brain.         YAMILA NOVAK MD             D: 2021   T: 2021   MT: RIA      Name:     MELY LOCO   MRN:      [id number]        Account:      [id number]   :      1970           Service Date: 2021      Document: [id number]

## 2021-02-17 ENCOUNTER — VIRTUAL VISIT (OUTPATIENT)
Dept: ORTHOPEDICS | Facility: CLINIC | Age: 51
End: 2021-02-17
Payer: COMMERCIAL

## 2021-02-17 DIAGNOSIS — D17.9 INTRAMUSCULAR LIPOMA: Primary | ICD-10-CM

## 2021-02-17 PROCEDURE — 99024 POSTOP FOLLOW-UP VISIT: CPT | Performed by: ORTHOPAEDIC SURGERY

## 2021-02-17 NOTE — LETTER
2/17/2021         RE: Ewa Salter  [address] [place] [address]        Dear Colleague,    Thank you for referring your patient, Ewa Salter, to the [facility] [place]. Please see a copy of my visit note below.    Ewa is a 51 year old who is being evaluated via a billable video visit.      How would you like to obtain your AVS? MyChart  If the video visit is dropped, the invitation should be resent by: Text to cell phone: [phone]  Will anyone else be joining your video visit? No      Video Start Time: 8:29 AM      Video-Visit Details    Type of service:  Video Visit    Video End Time:8:39 AM    Originating Location (pt. Location): Home    Distant Location (provider location):  home     Platform used for Video Visit: Palamida    This 51-year-old woman has a history of of a right thigh mass. 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 She is recovering well and her incision is clean dry and intact. She will resume activity as tolerated.    The plan is to have an MRI of the right thigh in 12 months. This can be without contrast. She is aware of the risk of local recurrence and subsequent malignant transformation. I have answered all of her questions today.        Sang Concepcion MD

## 2021-02-17 NOTE — NURSING NOTE
Reason For Visit:   Chief Complaint   Patient presents with     Surgical Followup     2-3 week post-op right thigh mass excision DOS 1/28/21  // incision looks good without drainage per pt        There were no vitals taken for this visit.    Pain Assessment  Patient Currently in Pain: No(no pain per pt)    Kae Moscoso ATC

## 2021-02-17 NOTE — PROGRESS NOTES
Ewa is a 51 year old who is being evaluated via a billable video visit.      How would you like to obtain your AVS? LocalBonusharOutlisten  If the video visit is dropped, the invitation should be resent by: Text to cell phone: [phone]  Will anyone else be joining your video visit? No      Video Start Time: 8:29 AM      Video-Visit Details    Type of service:  Video Visit    Video End Time:8:39 AM    Originating Location (pt. Location): Home    Distant Location (provider location):  home     Platform used for Video Visit: [employer]    This 51-year-old woman has a history of of a right thigh mass. 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 She is recovering well and her incision is clean dry and intact. She will resume activity as tolerated.    The plan is to have an MRI of the right thigh in 12 months. This can be without contrast. She is aware of the risk of local recurrence and subsequent malignant transformation. I have answered all of her questions today.

## 2021-03-27 ENCOUNTER — TELEPHONE (OUTPATIENT)
Dept: NEUROLOGY | Facility: CLINIC | Age: 51
End: 2021-03-27

## 2021-03-28 NOTE — TELEPHONE ENCOUNTER
Ewa called in [facility] to report that she started experiencing numbness on the right side of the face today. She denies any numbness in the right arm or leg. She denies any weakness or balance problems. She denies any other new symptoms. Patient has history of MS and follows with Dr Nettles. If there is any interval worsening in symptoms over the weekend, instructed patient to call back. Patient to contact the office on Monday to discuss if she needs a repeat scan.     Harsh Garcia MD

## 2021-03-29 ENCOUNTER — TELEPHONE (OUTPATIENT)
Dept: NEUROLOGY | Facility: CLINIC | Age: 51
End: 2021-03-29

## 2021-03-29 DIAGNOSIS — G35 MS (MULTIPLE SCLEROSIS) (H): Primary | ICD-10-CM

## 2021-03-29 RX ORDER — DIPHENHYDRAMINE HCL 25 MG
50 CAPSULE ORAL ONCE
Status: CANCELLED
Start: 2021-03-29

## 2021-03-29 RX ORDER — ACETAMINOPHEN 325 MG/1
650 TABLET ORAL ONCE
Status: CANCELLED
Start: 2021-03-29

## 2021-03-29 RX ORDER — HEPARIN SODIUM (PORCINE) LOCK FLUSH IV SOLN 100 UNIT/ML 100 UNIT/ML
5 SOLUTION INTRAVENOUS
Status: CANCELLED | OUTPATIENT
Start: 2021-03-29

## 2021-03-29 RX ORDER — METHYLPREDNISOLONE SODIUM SUCCINATE 125 MG/2ML
125 INJECTION, POWDER, LYOPHILIZED, FOR SOLUTION INTRAMUSCULAR; INTRAVENOUS ONCE
Status: CANCELLED
Start: 2021-03-29

## 2021-03-29 RX ORDER — HEPARIN SODIUM,PORCINE 10 UNIT/ML
5 VIAL (ML) INTRAVENOUS
Status: CANCELLED | OUTPATIENT
Start: 2021-03-29

## 2021-03-29 NOTE — TELEPHONE ENCOUNTER
I called Ewa back regarding her symptoms; She said that the numbness is unchanged, however, she feels strongly that this is likely a relapse (she states she hasn't felt this way before) and would prefer changing treatments; She is scheduled for her second COVID vaccine this week on 3/31/21; At her appointment in February, they discussed Rituximab; She is still taking her Tecfidera as well; Dr. Nettles, are you okay with this plan/request? Do you feel the need for any MRI imaging based on these symptoms? If changing to Rituximab, does she need a washout period from the Tecfidera? Thank you.    Sheela Mathews, MS RN Care Coordinator

## 2021-03-29 NOTE — TELEPHONE ENCOUNTER
Per discussion with Dr. Nettles, the decision has been made to change the patient's treatment over to Rituximab; She is currently on Tecfidera and will remain on that until she is able to start the Rituximab; She is scheduled for her second COVID vaccine on 3/26/21; Lab orders are in her chart and she will have those drawn this week; She and I discussed what to expect with the infusion with regards to pre-medications, length of time and waiting to schedule until we have insurance approval; She would like to infuse here at [facility]; New Rituximab therapy plan orders placed and routed to Dr. Nettles for review and signature; I will send PA request once the orders have been signed.    Sheela Mathews, MS RN Care Coordinator

## 2021-03-29 NOTE — TELEPHONE ENCOUNTER
Health Call Center    Phone Message    May a detailed message be left on voicemail: yes     Reason for Call: Symptoms or Concerns     If patient has red-flag symptoms, warm transfer to triage line    Current symptom or concern: Numbness on on right side of face, around mouth including lip. Spoke with on call neurologist on Saturday and was told to call back Monday to see if a repeat MRI is needed     Symptoms have been present for:  2 day(s)    Are there any new or worsening symptoms? Yes: new     Patient states that it seems to be staying the same.     Please advise and call patient back at your earliest convenience to discuss further       Action Taken: Other:  MS    Travel Screening: Not Applicable

## 2021-03-29 NOTE — TELEPHONE ENCOUNTER
Yes, I agree.  She does not need a washout of the Tecfidera, can keep taking that until we switch (but no need to refill it within a week or so of the infusion, if that is how the timing works out).  The infusion should be at earliest, 3 weeks after her last vaccine dose.  She will need some baseline labs for rituximab (will order).  I don't think MRI is needed at this time, as it wouldn't change the plan (switch treatments, steroids if symptoms worsen).  We were going to repeat it in the summer, and I'd like to do that as planned (will be new baseline scan).

## 2021-03-29 NOTE — TELEPHONE ENCOUNTER
Prior Authorization Infusion/Clinic Administered Request    Location: [facility]  Diagnosis and ICD: Relapsing Remitting Multiple Sclerosis, G35  Drug/Therapy: Rituximab 500mg IV x1 dose    Previously Tried and Failed Therapies: Tecfidera/dimethyl fumarate and Copaxone/glatiramer acetate    Date of provider note with supporting information: 2/9/21    Urgency (When is the patient scheduled?): ASAP please    Would you like to include any research articles? n/a        If yes please call [phone] for further instructions about sending that information

## 2021-03-29 NOTE — TELEPHONE ENCOUNTER
How is the numbness today.  Is it better, worse (spreading), no change?      If it's better or the same, then we should just watch it.  If is is getting worse, we should give her a course of steroids, and probably change treatment (to B cell depletion).  Has she had Covid vaccine?

## 2021-03-29 NOTE — TELEPHONE ENCOUNTER
Patient calling to request the status of a return call knowing Dr. Nettles is not in the office. Please call to discuss thank you.

## 2021-03-29 NOTE — TELEPHONE ENCOUNTER
I called Ewa back with Dr. Nettles's input; I will start the infusion setup process in another encounter.    Sheela Mathews, MS RN Care Coordinator

## 2021-03-29 NOTE — TELEPHONE ENCOUNTER
Rituximab orders signed by Dr. Nettles; I will send a PA request over to our infusion finance team.    Sheela Mathews, MS RN Care Coordinator

## 2021-03-30 ENCOUNTER — TELEPHONE (OUTPATIENT)
Dept: NEUROLOGY | Facility: CLINIC | Age: 51
End: 2021-03-30

## 2021-03-30 NOTE — TELEPHONE ENCOUNTER
Health Call Center    Phone Message    May a detailed message be left on voicemail: yes     Reason for Call: Medication Question or concern regarding medication   Prescription Clarification  Name of Medication:TECFIDERA 240 MG CPDR   Prescribing Provider: Dr. Nettles   Pharmacy:   [facility]        What on the order needs clarification? Deandra at pharmacy asks if generic is okay to fill as insurance no longer covers brand medication.    Please call Deandra direct at [phone]. Okay to leave .          Action Taken: Message routed to:  [facility] ([facility] Neurology    Travel Screening: Not Applicable

## 2021-03-30 NOTE — TELEPHONE ENCOUNTER
I called Deandra back at [facility] and advised generic dimethyl fumarate is okay, although the patient may not need a refill since we are changing treatment; The need for a refill will be based on how soon we are able to get her approved for new disease modifying therapy; Pharmacist verbalized understanding.    Sheela Mathews, MS RN Care Coordinator

## 2021-04-02 DIAGNOSIS — G35 MS (MULTIPLE SCLEROSIS) (H): ICD-10-CM

## 2021-04-02 LAB
HBV CORE AB SERPL QL IA: NONREACTIVE
HBV SURFACE AB SERPL IA-ACNC: 36.51 M[IU]/ML
HBV SURFACE AG SERPL QL IA: NONREACTIVE

## 2021-04-02 PROCEDURE — 82784 ASSAY IGA/IGD/IGG/IGM EACH: CPT | Mod: 59 | Performed by: PSYCHIATRY & NEUROLOGY

## 2021-04-02 PROCEDURE — 86704 HEP B CORE ANTIBODY TOTAL: CPT | Performed by: PSYCHIATRY & NEUROLOGY

## 2021-04-02 PROCEDURE — 87340 HEPATITIS B SURFACE AG IA: CPT | Performed by: PSYCHIATRY & NEUROLOGY

## 2021-04-02 PROCEDURE — 36415 COLL VENOUS BLD VENIPUNCTURE: CPT | Performed by: PSYCHIATRY & NEUROLOGY

## 2021-04-02 PROCEDURE — 82784 ASSAY IGA/IGD/IGG/IGM EACH: CPT | Performed by: PSYCHIATRY & NEUROLOGY

## 2021-04-02 PROCEDURE — 86706 HEP B SURFACE ANTIBODY: CPT | Performed by: PSYCHIATRY & NEUROLOGY

## 2021-04-05 LAB
IGA SERPL-MCNC: 165 MG/DL (ref 84–499)
IGG SERPL-MCNC: 1193 MG/DL (ref 610–1616)
IGM SERPL-MCNC: 248 MG/DL (ref 35–242)

## 2021-04-06 ENCOUNTER — TELEPHONE (OUTPATIENT)
Dept: NEUROLOGY | Facility: CLINIC | Age: 51
End: 2021-04-06

## 2021-04-06 NOTE — TELEPHONE ENCOUNTER
I called Ewa to let her know I got her message and that I am waiting to confirm approval with our infusion finance team.    Sheela Mathews MS RN Care Coordinator

## 2021-04-06 NOTE — TELEPHONE ENCOUNTER
[facility] Call Center    Phone Message    May a detailed message be left on voicemail: yes     Reason for Call: Other: Pt called to report she got letter from insurance today to advise Truxima has beed approved.  She wanted to advise of this and is not sure if this is the brand that she is supposed to be getting for infusion.    Please call Pt back to confirm and advise.    Action Taken: Message routed to:  [facility] Neurology    Travel Screening: Not Applicable

## 2021-04-14 NOTE — TELEPHONE ENCOUNTER
Coverage secured per infusion finance team.  Called Ewa and advised her to schedule her infusion as she had her second Covid vaccine two weeks ago.    Temi Puente RN

## 2021-04-15 DIAGNOSIS — Z20.822 ENCOUNTER FOR LABORATORY TESTING FOR COVID-19 VIRUS: Primary | ICD-10-CM

## 2021-04-23 NOTE — TELEPHONE ENCOUNTER
Patient called and confirm surgery with Dr. Concepcion on 1/28/2021 will work. Discuss date of H&P and COVID test. Patient stated all the dates and times will work and she will wait for the packet of information that was mailed.   16

## 2021-05-08 ENCOUNTER — HOSPITAL ENCOUNTER (OUTPATIENT)
Dept: LAB | Facility: CLINIC | Age: 51
Discharge: HOME | End: 2021-05-08
Attending: PSYCHIATRY & NEUROLOGY | Admitting: PSYCHIATRY & NEUROLOGY
Payer: COMMERCIAL

## 2021-05-08 DIAGNOSIS — Z20.822 ENCOUNTER FOR LABORATORY TESTING FOR COVID-19 VIRUS: ICD-10-CM

## 2021-05-08 LAB
SARS-COV-2 RNA RESP QL NAA+PROBE: NORMAL
SPECIMEN SOURCE: NORMAL

## 2021-05-08 PROCEDURE — U0005 INFEC AGEN DETEC AMPLI PROBE: HCPCS | Performed by: PSYCHIATRY & NEUROLOGY

## 2021-05-08 PROCEDURE — U0003 INFECTIOUS AGENT DETECTION BY NUCLEIC ACID (DNA OR RNA); SEVERE ACUTE RESPIRATORY SYNDROME CORONAVIRUS 2 (SARS-COV-2) (CORONAVIRUS DISEASE [COVID-19]), AMPLIFIED PROBE TECHNIQUE, MAKING USE OF HIGH THROUGHPUT TECHNOLOGIES AS DESCRIBED BY CMS-2020-01-R: HCPCS | Performed by: PSYCHIATRY & NEUROLOGY

## 2021-05-12 ENCOUNTER — INFUSION THERAPY VISIT (OUTPATIENT)
Dept: INFUSION THERAPY | Facility: CLINIC | Age: 51
End: 2021-05-12
Attending: PSYCHIATRY & NEUROLOGY
Payer: COMMERCIAL

## 2021-05-12 VITALS
TEMPERATURE: 97.9 F | DIASTOLIC BLOOD PRESSURE: 73 MMHG | SYSTOLIC BLOOD PRESSURE: 112 MMHG | HEART RATE: 108 BPM | OXYGEN SATURATION: 97 % | BODY MASS INDEX: 22.78 KG/M2 | RESPIRATION RATE: 16 BRPM | WEIGHT: 132.7 LBS

## 2021-05-12 DIAGNOSIS — G35 MS (MULTIPLE SCLEROSIS) (H): Primary | ICD-10-CM

## 2021-05-12 PROCEDURE — 258N000003 HC RX IP 258 OP 636: Performed by: PSYCHIATRY & NEUROLOGY

## 2021-05-12 PROCEDURE — 96375 TX/PRO/DX INJ NEW DRUG ADDON: CPT

## 2021-05-12 PROCEDURE — 250N000011 HC RX IP 250 OP 636: Performed by: PSYCHIATRY & NEUROLOGY

## 2021-05-12 PROCEDURE — 250N000013 HC RX MED GY IP 250 OP 250 PS 637: Performed by: PSYCHIATRY & NEUROLOGY

## 2021-05-12 PROCEDURE — 96366 THER/PROPH/DIAG IV INF ADDON: CPT

## 2021-05-12 PROCEDURE — 96365 THER/PROPH/DIAG IV INF INIT: CPT

## 2021-05-12 RX ORDER — METHYLPREDNISOLONE SODIUM SUCCINATE 125 MG/2ML
125 INJECTION, POWDER, LYOPHILIZED, FOR SOLUTION INTRAMUSCULAR; INTRAVENOUS ONCE
Status: CANCELLED
Start: 2021-05-12

## 2021-05-12 RX ORDER — DIPHENHYDRAMINE HCL 25 MG
50 CAPSULE ORAL ONCE
Status: CANCELLED
Start: 2021-05-12

## 2021-05-12 RX ORDER — DIPHENHYDRAMINE HCL 25 MG
50 CAPSULE ORAL ONCE
Status: COMPLETED | OUTPATIENT
Start: 2021-05-12 | End: 2021-05-12

## 2021-05-12 RX ORDER — ACETAMINOPHEN 325 MG/1
650 TABLET ORAL ONCE
Status: COMPLETED | OUTPATIENT
Start: 2021-05-12 | End: 2021-05-12

## 2021-05-12 RX ORDER — HEPARIN SODIUM,PORCINE 10 UNIT/ML
5 VIAL (ML) INTRAVENOUS
Status: CANCELLED | OUTPATIENT
Start: 2021-05-12

## 2021-05-12 RX ORDER — ACETAMINOPHEN 325 MG/1
650 TABLET ORAL ONCE
Status: CANCELLED
Start: 2021-05-12

## 2021-05-12 RX ORDER — HEPARIN SODIUM (PORCINE) LOCK FLUSH IV SOLN 100 UNIT/ML 100 UNIT/ML
5 SOLUTION INTRAVENOUS
Status: CANCELLED | OUTPATIENT
Start: 2021-05-12

## 2021-05-12 RX ORDER — METHYLPREDNISOLONE SODIUM SUCCINATE 125 MG/2ML
125 INJECTION, POWDER, LYOPHILIZED, FOR SOLUTION INTRAMUSCULAR; INTRAVENOUS ONCE
Status: COMPLETED | OUTPATIENT
Start: 2021-05-12 | End: 2021-05-12

## 2021-05-12 RX ADMIN — METHYLPREDNISOLONE SODIUM SUCCINATE 125 MG: 125 INJECTION, POWDER, FOR SOLUTION INTRAMUSCULAR; INTRAVENOUS at 09:14

## 2021-05-12 RX ADMIN — DIPHENHYDRAMINE HYDROCHLORIDE 50 MG: 25 CAPSULE ORAL at 09:14

## 2021-05-12 RX ADMIN — ACETAMINOPHEN 650 MG: 325 TABLET ORAL at 09:14

## 2021-05-12 RX ADMIN — RITUXIMAB-ABBS 500 MG: 10 INJECTION, SOLUTION INTRAVENOUS at 09:49

## 2021-05-12 ASSESSMENT — PAIN SCALES - GENERAL: PAINLEVEL: NO PAIN (0)

## 2021-05-12 NOTE — PATIENT INSTRUCTIONS
Dear Ewa Salter    Thank you for choosing [facility] ([facility]) for your infusion.  The following information is a summary of our appointment as well as important reminders.      We look forward in seeing you on your next appointment here at [facility] ([facility]).  Please don t hesitate to call us at [phone] to reschedule any of your appointments or to speak with one of the [facility] registered nurses.  It was a pleasure taking care of you today.    Sincerely,    [facility]  [address] [place] [address]  [address]  Phone:  [phone]  EDUCATION POST BIOLOGICAL/CHEMOTHERAPY INFUSION  Call the triage nurse at your clinic or seek medical attention if you have chills and/or temperature greater than or equal to 100.5, uncontrolled nausea/vomiting, diarrhea, constipation, dizziness, shortness of breath, chest pain, heart palpitations, weakness or any other new or concerning symptoms, questions or concerns.  You can not have any live virus vaccines prior to or during treatment or up to 6 months post infusion.  If you have an upcoming surgery, medical procedure or dental procedure during treatment, this should be discussed with your ordering physician and your surgeon/dentist.  If you are having any concerning symptom, if you are unsure if you should get your next infusion or wish to speak to a provider before your next infusion, please call your care coordinator or triage nurse at your clinic to notify them so we can adequately serve you.  Patient Education     Rituximab Solution for injection  What is this medicine?  RITUXIMAB (ri TUX i mab) is a monoclonal antibody. This medicine changes the way the body's immune system works. It is used commonly to treat non-Hodgkin lymphoma and other conditions. In cancer cells, this drug targets a specific protein  within cancer cells and stops the cancer cells from growing. It is also used to treat rheumatoid arthritis (RA). In RA, this medicine slows the inflammatory process and help reduce joint pain and swelling. This medicine is often used with other cancer or arthritis medications.  This medicine may be used for other purposes; ask your health care provider or pharmacist if you have questions.  What should I tell my health care provider before I take this medicine?  They need to know if you have any of these conditions:    blood disorders    heart disease    history of hepatitis B    infection (especially a virus infection such as chickenpox, cold sores, or herpes)    irregular heartbeat    kidney disease    lung or breathing disease, like asthma    lupus    an unusual or allergic reaction to rituximab, mouse proteins, other medicines, foods, dyes, or preservatives    pregnant or trying to get pregnant    breast-feeding  How should I use this medicine?  This medicine is for infusion into a vein. It is administered in a hospital or clinic by a specially trained health care professional.  A special MedGuide will be given to you by the pharmacist with each prescription and refill. Be sure to read this information carefully each time.  Talk to your pediatrician regarding the use of this medicine in children. This medicine is not approved for use in children.  Overdosage: If you think you have taken too much of this medicine contact a poison control center or emergency room at once.  NOTE: This medicine is only for you. Do not share this medicine with others.  What if I miss a dose?  It is important not to miss a dose. Call your doctor or health care professional if you are unable to keep an appointment.  What may interact with this medicine?    cisplatin    medicines for blood pressure    some other medicines for arthritis    vaccines  This list may not describe all possible interactions. Give your health care provider a  list of all the medicines, herbs, non-prescription drugs, or dietary supplements you use. Also tell them if you smoke, drink alcohol, or use illegal drugs. Some items may interact with your medicine.  What should I watch for while using this medicine?  Report any side effects that you notice during your treatment right away, such as changes in your breathing, fever, chills, dizziness or lightheadedness. These effects are more common with the first dose.  Visit your prescriber or health care professional for checks on your progress. You will need to have regular blood work. Report any other side effects. The side effects of this medicine can continue after you finish your treatment. Continue your course of treatment even though you feel ill unless your doctor tells you to stop.  Call your doctor or health care professional for advice if you get a fever, chills or sore throat, or other symptoms of a cold or flu. Do not treat yourself. This drug decreases your body's ability to fight infections. Try to avoid being around people who are sick.  This medicine may increase your risk to bruise or bleed. Call your doctor or health care professional if you notice any unusual bleeding.  Be careful brushing and flossing your teeth or using a toothpick because you may get an infection or bleed more easily. If you have any dental work done, tell your dentist you are receiving this medicine.  Avoid taking products that contain aspirin, acetaminophen, ibuprofen, naproxen, or ketoprofen unless instructed by your doctor. These medicines may hide a fever.  Do not become pregnant while taking this medicine. Women should inform their doctor if they wish to become pregnant or think they might be pregnant. There is a potential for serious side effects to an unborn child. Talk to your health care professional or pharmacist for more information. Do not breast-feed an infant while taking this medicine.  What side effects may I notice from  receiving this medicine?  Side effects that you should report to your doctor or health care professional as soon as possible:    allergic reactions like skin rash, itching or hives, swelling of the face, lips, or tongue    low blood counts - this medicine may decrease the number of white blood cells, red blood cells and platelets. You may be at increased risk for infections and bleeding.    signs of infection - fever or chills, cough, sore throat, pain or difficulty passing urine    signs of decreased platelets or bleeding - bruising, pinpoint red spots on the skin, black, tarry stools, blood in the urine    signs of decreased red blood cells - unusually weak or tired, fainting spells, lightheadedness    breathing problems    confused, not responsive    chest pain    fast, irregular heartbeat    feeling faint or lightheaded, falls    mouth sores    redness, blistering, peeling or loosening of the skin, including inside the mouth    stomach pain    swelling of the ankles, feet, or hands    trouble passing urine or change in the amount of urine  Side effects that usually do not require medical attention (report to your doctor or other health care professional if they continue or are bothersome):    anxiety    headache    loss of appetite    muscle aches    nausea    night sweats  This list may not describe all possible side effects. Call your doctor for medical advice about side effects. You may report side effects to FDA at 8-243-FDA-2803.  Where should I keep my medicine?  This drug is given in a hospital or clinic and will not be stored at home.  NOTE:This sheet is a summary. It may not cover all possible information. If you have questions about this medicine, talk to your doctor, pharmacist, or health care provider. Copyright  2016 [employer] Standard

## 2021-05-12 NOTE — LETTER
5/12/2021         RE: Ewa Salter  [address] [place] [address]        Dear Colleague,    Thank you for referring your patient, Ewa Salter, to the [facility]. Please see a copy of my visit note below.    Nursing Note  [facility] Salter [facility]:   Chief Complaint   Patient presents with     Infusion     Rituximab     During [facility]'s [facility] appointment, orders from Dr. Nettles were completed.  Frequency: once (first dose)    Progress note:  Patient identification verified by name and date of birth.  Assessment completed.  Vitals recorded in Doc Flowsheets.  Patient was provided with education regarding medication/procedure and possible side effects.  Patient verbalized understanding.     present during visit today: Not Applicable.    Treatment Conditions: ~~~ NOTE: If the patient answers yes to any of the questions below, hold the infusion and contact ordering provider or on-call provider.    1. Have you recently had an elevated temperature, fever, chills, productive cough, coughing for 3 weeks or longer or hemoptysis, abnormal vital signs, night sweats,  chest pain or have you noticed a decrease in your appetite, unexplained weight loss or fatigue? No  2. Do you have any open wounds or new incisions? No  3. Do you have any recent or upcoming hospitalizations, surgeries or dental procedures? Yes, surgery in January of 2021 incision healed.  4. Do you currently have or recently have had any signs of illness or infection or are you on any antibiotics? No  5. Have you had any new, sudden or worsening abdominal pain? No  6. Have you or anyone in your household received a live vaccination in the past 4 weeks? Please note:  No live vaccines while on biologic/chemotherapy until 6 months after the last treatment.  Patient can receive the flu vaccine  (shot only) and the pneumovax.  It is optimal for the patient to get these vaccines mid cycle, but they can be given at any time as long as it is not on the day of the infusion. No  7. Have you recently been diagnosed with any new nervous system diseases (ie. Multiple sclerosis, Guillain [place], seizures, neurological changes) or cancer diagnosis? No  8. Are you on any form of radiation or chemotherapy? No  9. Are you pregnant or breast feeding or do you have plans of pregnancy in the future? No  10. Have you been having any signs of worsening depression or suicidal ideations?  (benlysta only) N/A  11. Have there been any other new onset medical symptoms? No  Premedications: administered per order.  *oral benadryl & tylenol, IV Solu-Medrol    Drug Waste Record: No    Infusion length and rate:  infusion starts at 50 ml/hr, then increased by 50 ml/hr every 30 minutes to final rate of 300 ml/hr. (max rate 400 mL/hr, final rate of 300 due to volume of bag)    Labs: were not ordered for this appointment.  Vascular access: peripheral IV placed today.    Is the next appt scheduled? 6 mo follow up appointment scheduled  Asymptomatic COVID test completed? No    Administrations This Visit     acetaminophen (TYLENOL) tablet 650 mg     Admin Date  05/12/2021 Action  Given Dose  650 mg Route  Oral Administered By  Nayeli Engel RN          diphenhydrAMINE (BENADRYL) capsule 50 mg     Admin Date  05/12/2021 Action  Given Dose  50 mg Route  Oral Administered By  Nayeli Engel RN          methylPREDNISolone sodium succinate (solu-MEDROL) injection 125 mg     Admin Date  05/12/2021 Action  Given Dose  125 mg Route  Intravenous Administered By  Nayeli Engel RN          riTUXimab-abbs (TRUXIMA) 500 mg in sodium chloride 0.9 % 500 mL infusion for NON-oncology use     Admin Date  05/12/2021 Action  New Bag Dose  500 mg Route  Intravenous Administered By  Nayeli Engel RN              Post Infusion  Assessment:  Patient tolerated infusion without incident.     Discharge Plan:   Follow up plan of care with: primary care provider,  Discharge instructions were reviewed with patient.  Patient/representative verbalized understanding of discharge instructions and all questions answered.  [facility] in stable condition at 1300.    ERIC Fagan RN    /71   Pulse 68   Temp 98.2  F (36.8  C)   Resp 18   Wt 60.2 kg (132 lb 11.2 oz)   SpO2 97%   BMI 22.78 kg/m          Again, thank you for allowing me to participate in the care of your patient.        Sincerely,        [facility]

## 2021-05-12 NOTE — PROGRESS NOTES
Nursing Note  Ewa Salter presents [facility] for:   Chief Complaint   Patient presents with     Infusion     Rituximab     During [facility]'s [facility] appointment, orders from Dr. Nettles were completed.  Frequency: once (first dose)    Progress note:  Patient identification verified by name and date of birth.  Assessment completed.  Vitals recorded in Doc Flowsheets.  Patient was provided with education regarding medication/procedure and possible side effects.  Patient verbalized understanding.     present during visit today: Not Applicable.    Treatment Conditions: ~~~ NOTE: If the patient answers yes to any of the questions below, hold the infusion and contact ordering provider or on-call provider.    1. Have you recently had an elevated temperature, fever, chills, productive cough, coughing for 3 weeks or longer or hemoptysis, abnormal vital signs, night sweats,  chest pain or have you noticed a decrease in your appetite, unexplained weight loss or fatigue? No  2. Do you have any open wounds or new incisions? No  3. Do you have any recent or upcoming hospitalizations, surgeries or dental procedures? Yes, surgery in January of 2021 incision healed.  4. Do you currently have or recently have had any signs of illness or infection or are you on any antibiotics? No  5. Have you had any new, sudden or worsening abdominal pain? No  6. Have you or anyone in your household received a live vaccination in the past 4 weeks? Please note:  No live vaccines while on biologic/chemotherapy until 6 months after the last treatment.  Patient can receive the flu vaccine (shot only) and the pneumovax.  It is optimal for the patient to get these vaccines mid cycle, but they can be given at any time as long as it is not on the day of the infusion. No  7. Have you recently been diagnosed with any new nervous system diseases (ie. Multiple sclerosis,  Guillain [place], seizures, neurological changes) or cancer diagnosis? No  8. Are you on any form of radiation or chemotherapy? No  9. Are you pregnant or breast feeding or do you have plans of pregnancy in the future? No  10. Have you been having any signs of worsening depression or suicidal ideations?  (benlysta only) N/A  11. Have there been any other new onset medical symptoms? No  Premedications: administered per order.  *oral benadryl & tylenol, IV Solu-Medrol    Drug Waste Record: No    Infusion length and rate:  infusion starts at 50 ml/hr, then increased by 50 ml/hr every 30 minutes to final rate of 300 ml/hr. (max rate 400 mL/hr, final rate of 300 due to volume of bag)    Labs: were not ordered for this appointment.  Vascular access: peripheral IV placed today.    Is the next appt scheduled? 6 mo follow up appointment scheduled  Asymptomatic COVID test completed? No    Administrations This Visit     acetaminophen (TYLENOL) tablet 650 mg     Admin Date  05/12/2021 Action  Given Dose  650 mg Route  Oral Administered By  Nayeli Engel RN          diphenhydrAMINE (BENADRYL) capsule 50 mg     Admin Date  05/12/2021 Action  Given Dose  50 mg Route  Oral Administered By  Nayeli Engel RN          methylPREDNISolone sodium succinate (solu-MEDROL) injection 125 mg     Admin Date  05/12/2021 Action  Given Dose  125 mg Route  Intravenous Administered By  Nayeli Engel RN          riTUXimab-abbs (TRUXIMA) 500 mg in sodium chloride 0.9 % 500 mL infusion for NON-oncology use     Admin Date  05/12/2021 Action  New Bag Dose  500 mg Route  Intravenous Administered By  Nayeli Engel RN              Post Infusion Assessment:  Patient tolerated infusion without incident.     Discharge Plan:   Follow up plan of care with: primary care provider,  Discharge instructions were reviewed with patient.  Patient/representative verbalized understanding of discharge instructions and all questions answered.  Patient  [facility] in stable condition at 1300.    Nayeli Engel, RN   Domi Francois RN    /71   Pulse 68   Temp 98.2  F (36.8  C)   Resp 18   Wt 60.2 kg (132 lb 11.2 oz)   SpO2 97%   BMI 22.78 kg/m

## 2021-08-17 ENCOUNTER — OFFICE VISIT (OUTPATIENT)
Dept: NEUROLOGY | Facility: CLINIC | Age: 51
End: 2021-08-17
Attending: PSYCHIATRY & NEUROLOGY
Payer: COMMERCIAL

## 2021-08-17 ENCOUNTER — ANCILLARY PROCEDURE (OUTPATIENT)
Dept: MRI IMAGING | Facility: CLINIC | Age: 51
End: 2021-08-17
Attending: PSYCHIATRY & NEUROLOGY
Payer: COMMERCIAL

## 2021-08-17 VITALS
SYSTOLIC BLOOD PRESSURE: 111 MMHG | HEIGHT: 64 IN | BODY MASS INDEX: 21.94 KG/M2 | DIASTOLIC BLOOD PRESSURE: 67 MMHG | WEIGHT: 128.5 LBS | OXYGEN SATURATION: 99 % | HEART RATE: 73 BPM

## 2021-08-17 DIAGNOSIS — N31.9 NEUROGENIC BLADDER: ICD-10-CM

## 2021-08-17 DIAGNOSIS — G35 MS (MULTIPLE SCLEROSIS) (H): ICD-10-CM

## 2021-08-17 DIAGNOSIS — R20.2 PARESTHESIA: ICD-10-CM

## 2021-08-17 DIAGNOSIS — G35 MS (MULTIPLE SCLEROSIS) (H): Primary | ICD-10-CM

## 2021-08-17 PROCEDURE — G0463 HOSPITAL OUTPT CLINIC VISIT: HCPCS

## 2021-08-17 PROCEDURE — 99215 OFFICE O/P EST HI 40 MIN: CPT | Performed by: PSYCHIATRY & NEUROLOGY

## 2021-08-17 PROCEDURE — 70551 MRI BRAIN STEM W/O DYE: CPT | Performed by: RADIOLOGY

## 2021-08-17 ASSESSMENT — PAIN SCALES - GENERAL: PAINLEVEL: NO PAIN (0)

## 2021-08-17 ASSESSMENT — MIFFLIN-ST. JEOR: SCORE: 1192.87

## 2021-08-17 NOTE — LETTER
8/17/2021       RE: Ewa Salter  [address] [place] [address]     Dear Colleague,    Thank you for referring your patient, Ewa Salter, to the [facility] [place] [facility]. Please see a copy of my visit note below.    Service Date: 08/17/2021    REASON FOR VISIT:  Ewa Salter is a 51-year-old woman whom I follow for multiple sclerosis.  She returns for clinical followup and MRI review.  I last saw her in February of this year.    HISTORY OF PRESENT ILLNESS:  Ewa called in May with new numbness on the right side of her face.  She noticed this when she was watching TV in the evening and was drinking a cup of tea and she noted the right lip was numb.  This worsened over about an hour to an area above the right lip an inch or two in circumference feeling numb.  It then persisted and has gradually been improving but has not returned to normal.  She had been taking dimethyl fumarate for MS and had some lesion accumulation that had led us to consider changing treatment.  Ultimately, we decided to go ahead and change and she had a single rituximab infusion on 05/12/2021. She had repeat brain MRI done earlier today and we reviewed it together.  Initially, I did not think there were any definite new lesions except for possibly 1 in the right thalamus.  After reviewing the MRI report, there are 2 small new lesions, 1 in the left frontal intermediate white matter and 1 in the left parietal intermediate white matter.  Neither of these lesions would have caused her facial symptoms I think.  Symptom wise other than the facial numbness, she has felt that her balance is a bit more off, although she continues to think that an observer would not notice anything wrong with her walking.  The other issue has been her bladder.  She has had surgery for her ulcerative colitis including a J pouch and the feeling is that this  "has caused some bladder complications as well.  [facility], including a gynecologist, Dr. Tapia.  She has a cystocele and rectocele and they were trying to fit her for a pessary, but apparently, that could not really be done because of her altered anatomy.  She has frequency and urgency, basically a constant sensation of needing to urinate.  She has been prescribed Myrbetriq and that is helping a little bit.  She asked about what other options there are for her bladder.  I told her really apart from medication such as the Myrbetriq for overactive bladder, I cannot really think of anything else to be done.  I suppose sacral nerve stimulator or botulinum toxin into the bladder could be possibilities and we will consider having Urology discuss that if it continues to be a problem on the Myrbetriq.    PHYSICAL EXAMINATION:  Blood pressure 111/67, pulse 73, weight 128 pounds.  She is alert and oriented.  Affect is bright and language functions are normal.  Eye movements are full without diplopia or nystagmus.  Facial strength and sensation are normal, the latter to light touch.  Muscle bulk, tone, strength and dexterity are normal in the arms and legs.  Finger tapping, toe tapping and finger-nose-finger are normal.  Reflexes are normal and symmetric and her gait is narrow-based and stable.    IMPRESSION:  Relapsing-remitting multiple sclerosis with new right facial numbness while on dimethyl fumarate.  Her MRIs showed 2 small new interim lesions, neither of which would explain the symptoms.  I spent 42 minutes on Ewa's care on the date of service, which included chart and MRI review and face-to-face time.  I told her I think that the switch to rituximab was warranted even if we did not find a new \"smoking gun\" lesion to definitively diagnose the numbness as due to MS relapse.  We discussed the dosing schedule for rituximab, which we will based on B-cell repopulation.  We discussed the " bladder issue and decided to see how she does on the Myrbetriq for now.    PLAN:    1.  CD19 count in November.  2.  Follow up in 1 year.    Levy Nettles MD

## 2021-08-20 NOTE — PROGRESS NOTES
Service Date: 08/17/2021    REASON FOR VISIT:  Ewa Salter is a 51-year-old woman whom I follow for multiple sclerosis.  She returns for clinical followup and MRI review.  I last saw her in February of this year.    HISTORY OF PRESENT ILLNESS:  Ewa called in May with new numbness on the right side of her face.  She noticed this when she was watching TV in the evening and was drinking a cup of tea and she noted the right lip was numb.  This worsened over about an hour to an area above the right lip an inch or two in circumference feeling numb.  It then persisted and has gradually been improving but has not returned to normal.  She had been taking dimethyl fumarate for MS and had some lesion accumulation that had led us to consider changing treatment.  Ultimately, we decided to go ahead and change and she had a single rituximab infusion on 05/12/2021. She had repeat brain MRI done earlier today and we reviewed it together.  Initially, I did not think there were any definite new lesions except for possibly 1 in the right thalamus.  After reviewing the MRI report, there are 2 small new lesions, 1 in the left frontal intermediate white matter and 1 in the left parietal intermediate white matter.  Neither of these lesions would have caused her facial symptoms I think.  Symptom wise other than the facial numbness, she has felt that her balance is a bit more off, although she continues to think that an observer would not notice anything wrong with her walking.  The other issue has been her bladder.  She has had surgery for her ulcerative colitis including a J pouch and the feeling is that this has caused some bladder complications as well.  [facility] has been [facility], including a gynecologist, Dr. Tapia.  She has a cystocele and rectocele and they were trying to fit her for a pessary, but apparently, that could not really be done because of her altered anatomy.  She has frequency and urgency, basically a  "constant sensation of needing to urinate.  She has been prescribed Myrbetriq and that is helping a little bit.  She asked about what other options there are for her bladder.  I told her really apart from medication such as the Myrbetriq for overactive bladder, I cannot really think of anything else to be done.  I suppose sacral nerve stimulator or botulinum toxin into the bladder could be possibilities and we will consider having Urology discuss that if it continues to be a problem on the Myrbetriq.    PHYSICAL EXAMINATION:  Blood pressure 111/67, pulse 73, weight 128 pounds.  She is alert and oriented.  Affect is bright and language functions are normal.  Eye movements are full without diplopia or nystagmus.  Facial strength and sensation are normal, the latter to light touch.  Muscle bulk, tone, strength and dexterity are normal in the arms and legs.  Finger tapping, toe tapping and finger-nose-finger are normal.  Reflexes are normal and symmetric and her gait is narrow-based and stable.    IMPRESSION:  Relapsing-remitting multiple sclerosis with new right facial numbness while on dimethyl fumarate.  Her MRIs showed 2 small new interim lesions, neither of which would explain the symptoms.  I spent 42 minutes on Mely's care on the date of service, which included chart and MRI review and face-to-face time.  I told her I think that the switch to rituximab was warranted even if we did not find a new \"smoking gun\" lesion to definitively diagnose the numbness as due to MS relapse.  We discussed the dosing schedule for rituximab, which we will based on B-cell repopulation.  We discussed the bladder issue and decided to see how she does on the Myrbetriq for now.    PLAN:    1.  CD19 count in November.  2.  Follow up in 1 year.    Levy Nettles MD        D: 2021   T: 2021   MT: [email or web address]    Name:     MELY LOCO  MRN:      [id number]        Account:      [id number]   :      1970           " Service Date: 08/17/2021       Document: [id number]

## 2021-09-07 ENCOUNTER — TELEPHONE (OUTPATIENT)
Dept: NEUROLOGY | Facility: CLINIC | Age: 51
End: 2021-09-07

## 2021-09-07 NOTE — TELEPHONE ENCOUNTER
[facility] Call Center    Phone Message    May a detailed message be left on voicemail: yes     Reason for Call: Symptoms or Concerns     If patient has red-flag symptoms, warm transfer to triage line    Current symptom or concern: Right lip has gone number and part of mouth    Symptoms have been present for:  3 day(s)    Has patient previously been seen for this? No      Are there any new or worsening symptoms? No    No functioning issues.    Action Taken: Message routed to:  [facility] ([facility] Neurology    Travel Screening: Not Applicable

## 2021-09-07 NOTE — TELEPHONE ENCOUNTER
Discuss this patient with Dr Nettles. He has advise this patient to watch her symptoms for the next few days to see if the numbness spreads and if she is still having issues please call the clinic and let us know and Dr Nettles will send a steroid to the pharmacy.     Called patient and explained above message, patient acknowledge that she understands.    Shira Franco MA

## 2021-10-24 ENCOUNTER — HEALTH MAINTENANCE LETTER (OUTPATIENT)
Age: 51
End: 2021-10-24

## 2021-11-02 ENCOUNTER — VIRTUAL VISIT (OUTPATIENT)
Dept: NEUROLOGY | Facility: CLINIC | Age: 51
End: 2021-11-02
Attending: PSYCHIATRY & NEUROLOGY
Payer: COMMERCIAL

## 2021-11-02 DIAGNOSIS — G35 MS (MULTIPLE SCLEROSIS) (H): Primary | ICD-10-CM

## 2021-11-02 PROCEDURE — 99214 OFFICE O/P EST MOD 30 MIN: CPT | Mod: GT | Performed by: PSYCHIATRY & NEUROLOGY

## 2021-11-02 NOTE — LETTER
2021       RE: Ewa Salter  [address] [place] [address]     Dear Colleague,    Thank you for referring your patient, Ewa Salter, to the [facility] [place] at [facility]. Please see a copy of my visit note below.    Ewa is a 51 year old who is being evaluated via a billable video visit.      How would you like to obtain your AVS? [employer]  If the video visit is dropped, the invitation should be resent by: Other e-mail: [employer]  Will anyone else be joining your video visit? No      Video Start Time: 1158  Video-Visit Details    Type of service:  Video Visit    Video End Time:1230    Originating Location (pt. Location): Home    Distant Location (provider location):  [facility] [place]     Platform used for Video Visit: [employer]    ID:  Ewa Salter is a 51 year old woman who I follow for MS.  This was a video visit at her request to discuss concerns with therapy.    HPI:  Ewa had a relapse in March of this year, in the context of prior lesion accumulation while on dimethyl fumarate, and we decided to change her treatment to rituximab.  She had her initial (single, 500 mg) infusion on 21, after her Covid-19 vaccination.    She set up this visit to discuss her concerns about being on the rituximab during the pandemic.  She is an , and has been reading the literature and become rather alarmed.  She cites in particular a paper by Dick et al. (Debi Hematol ), in which they retrospectively studied adults diagnosed with Covid-19 in the [facility] system who had received rituximab for any reason from 2019-10/2020.  Of 49 included patiens, over 60% required hospitalization, and 1/3rd .  She wonders whether she should be changed to a safer medication.    We had a long discussion about this.  While I only had time to skim the paper during our  "conversation, I noted that the majority of the patient included had been treated for malignancy or other hematologic disorders, and that only 5 patiens had been treated for \"autoimmune neurologic diseases (myasthenia gravis, autoimmune encephalitis, CIDP, MS, NMOSD)\".  Thus, the patient population in that study did not at all reflect a population of MS patients.  The published studies in MS have been far less dire, although there has been some indication of increased risk of severe disease with B cell depletion.  I told her that of my patients who have gotten Covid while on B cell depletion, most have been asymptomatic or had mild illness, with only one (who has very advanced MS) requiring hospitalization, and this is consistent with published data on the first 100 or so cases in MS patients on ocrelizumab.    I told her that I am not particularly concerned about her being on rituximab during the pandemic, especially since she was vaccinated before starting it.  I generally recommend that patients on such medication wear a mask/socially distance when around anyone but those they know are vaccinated.  While it is not ideal to be on immunosuppressive medications during the pandemic, the non-immunosuppressive option (DMF) was clearly inadequately controlling her disease.  We discussed whether natalizumab was \"safer\" than B cell depletion during the pandemic.  I told her I was not aware of any clear data on this, but my impression is that natalizumab might be less likely to cause increase in susceptibility or more severe course than B cell depletion.    She was reassured, at least somewhat, after our discussion.  We decided to check LUANNE virus antibodies at the time of her upcoming CD19 count, which will tell us if natalizumab is an option, but ultimately we will make no changes for now.    Imp:  RRMS, stable on rituximab.  I spent 35 minutes on her care on the date of service, including chart review and video time " discussion the issues above.  Plan:  Follow-up as previously scheduled, [facility].      Again, thank you for allowing me to participate in the care of your patient.      Sincerely,    Levy Nettles MD

## 2021-11-02 NOTE — PROGRESS NOTES
"Ewa is a 51 year old who is being evaluated via a billable video visit.      How would you like to obtain your AVS? [employer]  If the video visit is dropped, the invitation should be resent by: Other e-mail: IVORY  Will anyone else be joining your video visit? No      Video Start Time: 1158  Video-Visit Details    Type of service:  Video Visit    Video End Time:1230    Originating Location (pt. Location): Home    Distant Location (provider location):  [facility] MULTIPLE S[facility] CLINIC [place]     Platform used for Video Visit: [employer]    ID:  Ewa Salter is a 51 year old woman who I follow for MS.  This was a video visit at her request to discuss concerns with therapy.    HPI:  Ewa had a relapse in March of this year, in the context of prior lesion accumulation while on dimethyl fumarate, and we decided to change her treatment to rituximab.  She had her initial (single, 500 mg) infusion on 21, after her Covid-19 vaccination.    She set up this visit to discuss her concerns about being on the rituximab during the pandemic.  She is an , and has been reading the literature and become rather alarmed.  She cites in particular a paper by Dick et al. (Debi Hematol ), in which they retrospectively studied adults diagnosed with Covid-19 in the [facility] system who had received rituximab for any reason from 2019-10/2020.  Of 49 included patiens, over 60% required hospitalization, and 1/3rd .  She wonders whether she should be changed to a safer medication.    We had a long discussion about this.  While I only had time to skim the paper during our conversation, I noted that the majority of the patient included had been treated for malignancy or other hematologic disorders, and that only 5 patiens had been treated for \"autoimmune neurologic diseases (myasthenia gravis, autoimmune encephalitis, CIDP, MS, NMOSD)\".  Thus, the patient population in that study did not at all " "reflect a population of MS patients.  The published studies in MS have been far less dire, although there has been some indication of increased risk of severe disease with B cell depletion.  I told her that of my patients who have gotten Covid while on B cell depletion, most have been asymptomatic or had mild illness, with only one (who has very advanced MS) requiring hospitalization, and this is consistent with published data on the first 100 or so cases in MS patients on ocrelizumab.    I told her that I am not particularly concerned about her being on rituximab during the pandemic, especially since she was vaccinated before starting it.  I generally recommend that patients on such medication wear a mask/socially distance when around anyone but those they know are vaccinated.  While it is not ideal to be on immunosuppressive medications during the pandemic, the non-immunosuppressive option (DMF) was clearly inadequately controlling her disease.  We discussed whether natalizumab was \"safer\" than B cell depletion during the pandemic.  I told her I was not aware of any clear data on this, but my impression is that natalizumab might be less likely to cause increase in susceptibility or more severe course than B cell depletion.    She was reassured, at least somewhat, after our discussion.  We decided to check LUANNE virus antibodies at the time of her upcoming CD19 count, which will tell us if natalizumab is an option, but ultimately we will make no changes for now.    Imp:  RRMS, stable on rituximab.  I spent 35 minutes on her care on the date of service, including chart review and video time discussion the issues above.  Plan:  Follow-up as previously scheduled, JCV Ab.  "

## 2021-11-11 ENCOUNTER — LAB (OUTPATIENT)
Dept: LAB | Facility: CLINIC | Age: 51
End: 2021-11-11
Payer: COMMERCIAL

## 2021-11-11 DIAGNOSIS — G35 MS (MULTIPLE SCLEROSIS) (H): ICD-10-CM

## 2021-11-11 PROCEDURE — 36415 COLL VENOUS BLD VENIPUNCTURE: CPT

## 2021-11-11 PROCEDURE — 86355 B CELLS TOTAL COUNT: CPT

## 2021-11-12 LAB
CD19 CELLS # BLD: 1 CELLS/UL (ref 107–698)
CD19 CELLS NFR BLD: <1 % (ref 6–27)

## 2021-11-17 DIAGNOSIS — G35 MS (MULTIPLE SCLEROSIS) (H): Primary | ICD-10-CM

## 2021-11-19 LAB — SCANNED LAB RESULT: ABNORMAL

## 2022-01-20 ENCOUNTER — LAB (OUTPATIENT)
Dept: LAB | Facility: CLINIC | Age: 52
End: 2022-01-20
Payer: COMMERCIAL

## 2022-01-20 DIAGNOSIS — G35 MS (MULTIPLE SCLEROSIS) (H): ICD-10-CM

## 2022-01-20 PROCEDURE — 86355 B CELLS TOTAL COUNT: CPT

## 2022-01-20 PROCEDURE — 36415 COLL VENOUS BLD VENIPUNCTURE: CPT

## 2022-01-21 LAB
CD19 CELLS # BLD: <1 CELLS/UL (ref 107–698)
CD19 CELLS NFR BLD: <1 % (ref 6–27)

## 2022-01-27 DIAGNOSIS — G35 MS (MULTIPLE SCLEROSIS) (H): Primary | ICD-10-CM

## 2022-01-31 ENCOUNTER — TRANSFERRED RECORDS (OUTPATIENT)
Dept: HEALTH INFORMATION MANAGEMENT | Facility: CLINIC | Age: 52
End: 2022-01-31
Payer: COMMERCIAL

## 2022-02-01 ENCOUNTER — TELEPHONE (OUTPATIENT)
Dept: ORTHOPEDICS | Facility: CLINIC | Age: 52
End: 2022-02-01
Payer: COMMERCIAL

## 2022-02-01 NOTE — TELEPHONE ENCOUNTER
[facility] Call Center    Phone Message    May a detailed message be left on voicemail: yes     Reason for Call: Other: Patient wanted to make sure it's ok for her to do a virtual visit. The mass has recurred. She is having [facility] push her MRI to us. Only need to call back if she needs to change to in person      Action Taken: Message routed to:  [facility]    Travel Screening: Not Applicable

## 2022-02-04 ENCOUNTER — VIRTUAL VISIT (OUTPATIENT)
Dept: ORTHOPEDICS | Facility: CLINIC | Age: 52
End: 2022-02-04
Payer: COMMERCIAL

## 2022-02-04 DIAGNOSIS — C49.21 ATYPICAL LIPOMATOUS TUMOR OF RIGHT LOWER EXTREMITY (H): Primary | ICD-10-CM

## 2022-02-04 PROCEDURE — 99213 OFFICE O/P EST LOW 20 MIN: CPT | Mod: GT | Performed by: ORTHOPAEDIC SURGERY

## 2022-02-04 NOTE — PROGRESS NOTES
Ewa is a 51 year old who is being evaluated via a billable video visit.      How would you like to obtain your AVS? [employer]  If the video visit is dropped, the invitation should be resent by: Send to e-mail at: [email or web address]  Will anyone else be joining your video visit? No      Video Start Time: 12:19 PM    Video-Visit Details    Type of service:  Video Visit    Video End Time:12:40 PM    Originating Location (pt. Location): Home    Distant Location (provider location):  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  She has not noticed any recurrence of the tumor.  She presents now to review an MRI to check for recurrence that we routinely do 1 year after surgery for atypical lipomas.    Over the video she was alert oriented had a normal mood and affect and was in no acute distress.    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  It is fatty and does not show any sign of a malignant change.  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    We discussed the risks of this being low for subsequent malignant change.  The patient has opted for observation with a repeat MRI in 12 months.  I think this is a very reasonable approach.  She will notify us if she notices the mass or notices discomfort in the thigh.  In either of these cases we would repeat the MRI immediately.  I have answered all of her questions.

## 2022-02-04 NOTE — LETTER
2/4/2022       RE: Ewa Salter  [address] [place] [address]    Dear Colleague,    Thank you for referring your patient, Ewa Salter, to the [facility]. Please see a copy of my visit note below.    Ewa is a 51 year old who is being evaluated via a billable video visit.      How would you like to obtain your AVS? MyChart  If the video visit is dropped, the invitation should be resent by: Send to e-mail at: [email or web address]  Will anyone else be joining your video visit? No    Video Start Time: 12:19 PM    Video-Visit Details    Type of service:  Video Visit    Video End Time:12:40 PM    Originating Location (pt. 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  She has not noticed any recurrence of the tumor.  She presents now to review an MRI to check for recurrence that we routinely do 1 year after surgery for atypical lipomas.    Over the video she was alert oriented had a normal mood and affect and was in no acute distress.    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  It is fatty and does not show any sign of a malignant change.  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    We discussed the risks of this being low for subsequent malignant change.  The patient has opted for observation with a repeat MRI in 12 months.  I think this is a very reasonable approach.  She will notify us if she notices the mass or notices discomfort in the thigh.  In either of these cases we would repeat the MRI immediately.  I have answered all of her questions.    Again, thank you for allowing me to participate in the care of your patient.      Sincerely,      Sang Concepcion MD

## 2022-02-04 NOTE — NURSING NOTE
Reason For Visit:   Chief Complaint   Patient presents with     RECHECK     review recent MRI        There were no vitals taken for this visit.    Pain Assessment  Patient Currently in Pain: Denies (no pain per pt)      Kae Moscoso ATC

## 2022-02-13 ENCOUNTER — HEALTH MAINTENANCE LETTER (OUTPATIENT)
Age: 52
End: 2022-02-13

## 2022-03-30 ENCOUNTER — LAB (OUTPATIENT)
Dept: LAB | Facility: CLINIC | Age: 52
End: 2022-03-30
Payer: COMMERCIAL

## 2022-03-30 DIAGNOSIS — G35 MS (MULTIPLE SCLEROSIS) (H): ICD-10-CM

## 2022-03-30 PROCEDURE — 36415 COLL VENOUS BLD VENIPUNCTURE: CPT

## 2022-03-30 PROCEDURE — 86355 B CELLS TOTAL COUNT: CPT

## 2022-03-31 LAB
CD19 CELLS # BLD: 1 CELLS/UL (ref 107–698)
CD19 CELLS NFR BLD: <1 % (ref 6–27)

## 2022-04-04 DIAGNOSIS — G35 MS (MULTIPLE SCLEROSIS) (H): Primary | ICD-10-CM

## 2022-04-10 ENCOUNTER — HEALTH MAINTENANCE LETTER (OUTPATIENT)
Age: 52
End: 2022-04-10

## 2022-06-10 ENCOUNTER — LAB (OUTPATIENT)
Dept: LAB | Facility: CLINIC | Age: 52
End: 2022-06-10
Payer: COMMERCIAL

## 2022-06-10 DIAGNOSIS — G35 MS (MULTIPLE SCLEROSIS) (H): ICD-10-CM

## 2022-06-10 LAB
CD19 CELLS # BLD: 3 CELLS/UL (ref 107–698)
CD19 CELLS NFR BLD: <1 % (ref 6–27)

## 2022-06-10 PROCEDURE — 36415 COLL VENOUS BLD VENIPUNCTURE: CPT

## 2022-06-10 PROCEDURE — 86355 B CELLS TOTAL COUNT: CPT

## 2022-06-13 DIAGNOSIS — G35 MS (MULTIPLE SCLEROSIS) (H): Primary | ICD-10-CM

## 2022-08-17 ENCOUNTER — LAB (OUTPATIENT)
Dept: LAB | Facility: CLINIC | Age: 52
End: 2022-08-17
Payer: COMMERCIAL

## 2022-08-17 DIAGNOSIS — G35 MS (MULTIPLE SCLEROSIS) (H): ICD-10-CM

## 2022-08-17 LAB
CD19 CELLS # BLD: 5 CELLS/UL (ref 107–698)
CD19 CELLS NFR BLD: <1 % (ref 6–27)

## 2022-08-17 PROCEDURE — 36415 COLL VENOUS BLD VENIPUNCTURE: CPT

## 2022-08-17 PROCEDURE — 86355 B CELLS TOTAL COUNT: CPT

## 2022-08-23 ENCOUNTER — TELEPHONE (OUTPATIENT)
Dept: NEUROLOGY | Facility: CLINIC | Age: 52
End: 2022-08-23

## 2022-08-23 ENCOUNTER — OFFICE VISIT (OUTPATIENT)
Dept: NEUROLOGY | Facility: CLINIC | Age: 52
End: 2022-08-23
Attending: PSYCHIATRY & NEUROLOGY
Payer: COMMERCIAL

## 2022-08-23 VITALS
OXYGEN SATURATION: 100 % | BODY MASS INDEX: 22.74 KG/M2 | HEART RATE: 68 BPM | SYSTOLIC BLOOD PRESSURE: 107 MMHG | WEIGHT: 133.2 LBS | HEIGHT: 64 IN | DIASTOLIC BLOOD PRESSURE: 65 MMHG

## 2022-08-23 DIAGNOSIS — G35 MS (MULTIPLE SCLEROSIS) (H): Primary | ICD-10-CM

## 2022-08-23 PROCEDURE — G0463 HOSPITAL OUTPT CLINIC VISIT: HCPCS

## 2022-08-23 PROCEDURE — 99215 OFFICE O/P EST HI 40 MIN: CPT | Performed by: PSYCHIATRY & NEUROLOGY

## 2022-08-23 RX ORDER — MIRABEGRON 25 MG/1
25 TABLET, FILM COATED, EXTENDED RELEASE ORAL
COMMUNITY
Start: 2021-05-17

## 2022-08-23 RX ORDER — ACETAMINOPHEN 325 MG/1
650 TABLET ORAL ONCE
Status: CANCELLED
Start: 2022-08-23

## 2022-08-23 RX ORDER — HEPARIN SODIUM (PORCINE) LOCK FLUSH IV SOLN 100 UNIT/ML 100 UNIT/ML
5 SOLUTION INTRAVENOUS
Status: CANCELLED | OUTPATIENT
Start: 2022-08-23

## 2022-08-23 RX ORDER — ALBUTEROL SULFATE 90 UG/1
1-2 AEROSOL, METERED RESPIRATORY (INHALATION)
Status: CANCELLED
Start: 2022-08-23

## 2022-08-23 RX ORDER — METHYLPREDNISOLONE SODIUM SUCCINATE 125 MG/2ML
125 INJECTION, POWDER, LYOPHILIZED, FOR SOLUTION INTRAMUSCULAR; INTRAVENOUS ONCE
Status: CANCELLED | OUTPATIENT
Start: 2022-08-23

## 2022-08-23 RX ORDER — DIPHENHYDRAMINE HCL 25 MG
50 CAPSULE ORAL ONCE
Status: CANCELLED
Start: 2022-08-23

## 2022-08-23 RX ORDER — ALBUTEROL SULFATE 0.83 MG/ML
2.5 SOLUTION RESPIRATORY (INHALATION)
Status: CANCELLED | OUTPATIENT
Start: 2022-08-23

## 2022-08-23 RX ORDER — EPINEPHRINE 1 MG/ML
0.3 INJECTION, SOLUTION, CONCENTRATE INTRAVENOUS EVERY 5 MIN PRN
Status: CANCELLED | OUTPATIENT
Start: 2022-08-23

## 2022-08-23 RX ORDER — DIPHENHYDRAMINE HYDROCHLORIDE 50 MG/ML
50 INJECTION INTRAMUSCULAR; INTRAVENOUS
Status: CANCELLED
Start: 2022-08-23

## 2022-08-23 RX ORDER — HEPARIN SODIUM,PORCINE 10 UNIT/ML
5 VIAL (ML) INTRAVENOUS
Status: CANCELLED | OUTPATIENT
Start: 2022-08-23

## 2022-08-23 RX ORDER — MEPERIDINE HYDROCHLORIDE 25 MG/ML
25 INJECTION INTRAMUSCULAR; INTRAVENOUS; SUBCUTANEOUS EVERY 30 MIN PRN
Status: CANCELLED | OUTPATIENT
Start: 2022-08-23

## 2022-08-23 RX ORDER — METHYLPREDNISOLONE SODIUM SUCCINATE 125 MG/2ML
125 INJECTION, POWDER, LYOPHILIZED, FOR SOLUTION INTRAMUSCULAR; INTRAVENOUS
Status: CANCELLED
Start: 2022-08-23

## 2022-08-23 ASSESSMENT — PAIN SCALES - GENERAL: PAINLEVEL: NO PAIN (0)

## 2022-08-23 NOTE — PROGRESS NOTES
Service Date: 08/23/2022    REASON FOR VISIT:  Ewa Salter is a 52-year-old woman who I follow for multiple sclerosis.  She returns for routine followup.  I last saw her in 11/2021.    HISTORY OF PRESENT ILLNESS:  Ewa has been neurologically stable recently.  She had called after feeling off balance and having a few falls in late November/early December.  With the lack of any focal symptoms, I felt this could just be watched and it resolved after a few weeks.  She had Evusheld in 03/2022.  She had a single rituximab infusion on 05/12/2021 and her B-cells have been persistently depleted ever since.  The absolute B-cell count was zero in 01/2022, 1 in March, 3 in June and 5 on 08/17/2022.  Ewa is somewhat nervous about getting her next Evusheld given that it has been no been over a year since her last rituximab infusion.  She thinks it may not be approved because of that and wonders if we should just go ahead with the next infusion.  I told her there is no particular problem with giving the next rituximab infusion now, although I am sure we could get the evusheld done by explaining the situation.  Rituximab was started after she had some MRI lesion accumulation and a clinical relapse of right facial numbness last spring.  In terms of residual symptoms, she has some fatigue and some bladder involvement as well as some residual numbness in the hands and the right face.  Motor function is generally fine as is vision and swallowing.  She had a leg lipoma recur and is following with surveillance imaging for that through [place].    PHYSICAL EXAMINATION:  Blood pressure 107/65, pulse 68, weight 133 pounds.  She is alert and oriented.  Affect is bright and language functions are normal.  Cranial nerves are unremarkable.  Muscle bulk, tone, strength and dexterity are normal in the arms and legs.  Light touch is intact in the limbs.  Finger tapping, toe tapping and finger-nose-finger are normal.  Reflexes are 2/4 and symmetric  at the elbows and knees.  Gait is narrow-based and stable with normal tandem walk and negative Romberg.    IMPRESSION:  Relapsing-remitting multiple sclerosis, clinically stable on dimethyl fumarate.    I spent 42 minutes on her care today including chart review, face-to-face and documentation time.  We discussed MRI surveillance as well as the question of Evusheld in the context of B-cell depletion.  She got her initial vaccine doses for her initial rituximab, so I would not consider her at particularly high risk, but I told her I do think the Evusheld is a reasonable adjunct to on B-cell depletion.  She is a bit nervous about the long time between infusions (although she understands the rationale of dosing based on B cell repopulation) and ultimately we decided to go ahead with the rituximab infusion.      PLAN:       1. MRI brain.     2. We will go ahead and proceed with the next rituximab infusion and get a CD19 count immediately before.     3. Follow up in 1 year.    Levy Nettles MD        D: 2022   T: 2022   MT: vic    Name:     MELY LOCO  MRN:      [id number]        Account:      [id number]   :      1970           Service Date: 2022       Document: [id number]

## 2022-08-23 NOTE — LETTER
8/23/2022     RE: Ewa Salter  [address] [place] [address]     Dear Colleague,    Thank you for referring your patient, Ewa Salter, to the [facility] MULTIPLE SCLEROSIS CLINIC [place] [facility]. Please see a copy of my visit note below.    Service Date: 08/23/2022    REASON FOR VISIT:  Ewa Salter is a 52-year-old woman who I follow for multiple sclerosis.  She returns for routine followup.  I last saw her in 11/2021.    HISTORY OF PRESENT ILLNESS:  Ewa has been neurologically stable recently.  She had called after feeling off balance and having a few falls in late November/early December.  With the lack of any focal symptoms, I felt this could just be watched and it resolved after a few weeks.  She had Evusheld in 03/2022.  She had a single rituximab infusion on 05/12/2021 and her B-cells have been persistently depleted ever since.  The absolute B-cell count was zero in 01/2022, 1 in March, 3 in June and 5 on 08/17/2022.  Ewa is somewhat nervous about getting her next Evusheld given that it has been no been over a year since her last rituximab infusion.  She thinks it may not be approved because of that and wonders if we should just go ahead with the next infusion.  I told her there is no particular problem with giving the next rituximab infusion now, although I am sure we could get the evusheld done by explaining the situation.  Rituximab was started after she had some MRI lesion accumulation and a clinical relapse of right facial numbness last spring.  In terms of residual symptoms, she has some fatigue and some bladder involvement as well as some residual numbness in the hands and the right face.  Motor function is generally fine as is vision and swallowing.  She had a leg lipoma recur and is following with surveillance imaging for that through [place].    PHYSICAL EXAMINATION:  Blood pressure 107/65, pulse 68, weight 133  pounds.  She is alert and oriented.  Affect is bright and language functions are normal.  Cranial nerves are unremarkable.  Muscle bulk, tone, strength and dexterity are normal in the arms and legs.  Light touch is intact in the limbs.  Finger tapping, toe tapping and finger-nose-finger are normal.  Reflexes are 2/4 and symmetric at the elbows and knees.  Gait is narrow-based and stable with normal tandem walk and negative Romberg.    IMPRESSION:  Relapsing-remitting multiple sclerosis, clinically stable on dimethyl fumarate.    I spent 42 minutes on her care today including chart review, face-to-face and documentation time.  We discussed MRI surveillance as well as the question of Evusheld in the context of B-cell depletion.  She got her initial vaccine doses for her initial rituximab, so I would not consider her at particularly high risk, but I told her I do think the Evusheld is a reasonable adjunct to on B-cell depletion.  She is a bit nervous about the long time between infusions (although she understands the rationale of dosing based on B cell repopulation) and ultimately we decided to go ahead with the rituximab infusion.      PLAN:       1. MRI brain.     2. We will go ahead and proceed with the next rituximab infusion and get a CD19 count immediately before.     3. Follow up in 1 year.      Levy Nettles MD    D: 2022   T: 2022   MT: vic  Name:     MELY LOCO  MRN:      [id number]        Account:      [id number]   :      1970           Service Date: 2022   Document: [id number]

## 2022-08-23 NOTE — TELEPHONE ENCOUNTER
Pt had clinic visit with Dr Nettles and decision was made to proceed with next rituximab infusion now. Pt infuses at [facility]. Therapy plan pended to Dr Nettles for signature. Once signed, will instruct pt to schedule. PA request in separate encounter. Current PA appears to have  2022.    Alethea Grimaldo RN

## 2022-08-23 NOTE — TELEPHONE ENCOUNTER
Freeppie message sent to pt to inform her orders are signed and she can call to schedule. Phone number for [facility] provided.    Alethea Grimaldo RN

## 2022-08-23 NOTE — TELEPHONE ENCOUNTER
Prior Authorization Infusion/Clinic Administered Request    Location: [facility]  Diagnosis and ICD:Multiple Sclerosis, G35  Drug/Therapy: Rituximab 500 mg IV once    Previously Tried and Failed Therapies: Tecfidera/dimethyl fumarate and Copaxone/glatiramer acetate. Pt currently stable on rituximab. Current PA  in 2022.    Date of provider note with supporting information: 2022    Urgency (When is the patient scheduled?): ASAP please    Would you like to include any research articles?         If yes please call [phone] for further instructions about sending that information

## 2022-08-24 ENCOUNTER — ANCILLARY PROCEDURE (OUTPATIENT)
Dept: MRI IMAGING | Facility: CLINIC | Age: 52
End: 2022-08-24
Attending: PSYCHIATRY & NEUROLOGY
Payer: COMMERCIAL

## 2022-08-24 DIAGNOSIS — G35 MS (MULTIPLE SCLEROSIS) (H): ICD-10-CM

## 2022-08-24 PROCEDURE — A9585 GADOBUTROL INJECTION: HCPCS | Performed by: RADIOLOGY

## 2022-08-24 PROCEDURE — 70553 MRI BRAIN STEM W/O & W/DYE: CPT | Mod: GC | Performed by: RADIOLOGY

## 2022-08-24 RX ORDER — GADOBUTROL 604.72 MG/ML
7.5 INJECTION INTRAVENOUS ONCE
Status: COMPLETED | OUTPATIENT
Start: 2022-08-24 | End: 2022-08-24

## 2022-08-24 RX ADMIN — GADOBUTROL 6 ML: 604.72 INJECTION INTRAVENOUS at 13:17

## 2022-08-24 NOTE — DISCHARGE INSTRUCTIONS
MRI Contrast Discharge Instructions    The IV contrast you received today will pass out of your body in your  urine. This will happen in the next 24 hours. You will not feel this process.  Your urine will not change color.    Drink at least 4 extra glasses of water or juice today (unless your doctor  has restricted your fluids). This reduces the stress on your kidneys.  You may take your regular medicines.    If you are on dialysis: It is best to have dialysis today.    If you have a reaction: Most reactions happen right away. If you have  any new symptoms after leaving the hospital (such as hives or swelling),  call your hospital at the correct number below. Or call your family doctor.  If you have breathing distress or wheezing, call 911.    Special instructions: ***    I have read and understand the above information.    Signature:______________________________________ Date:___________    Staff:__________________________________________ Date:___________     Time:__________    [facility] Departments:    ___[place]: [phone]  ___[facility]: [phone]  ___[place]: [phone] ___[facility]: [phone]  ___[facility]: [phone]  ___[facility]: [phone]  ___[phone]  ___[facility]: [phone]  ___[phone]

## 2022-08-29 NOTE — TELEPHONE ENCOUNTER
[facility] is approved.    Alethea Grimaldo RN

## 2022-09-15 ENCOUNTER — LAB (OUTPATIENT)
Dept: LAB | Facility: CLINIC | Age: 52
End: 2022-09-15
Payer: COMMERCIAL

## 2022-09-15 DIAGNOSIS — G35 MS (MULTIPLE SCLEROSIS) (H): ICD-10-CM

## 2022-09-15 PROCEDURE — 86355 B CELLS TOTAL COUNT: CPT

## 2022-09-15 PROCEDURE — 36415 COLL VENOUS BLD VENIPUNCTURE: CPT

## 2022-09-16 LAB
CD19 CELLS # BLD: 4 CELLS/UL (ref 107–698)
CD19 CELLS NFR BLD: <1 % (ref 6–27)

## 2022-09-20 ENCOUNTER — INFUSION THERAPY VISIT (OUTPATIENT)
Dept: INFUSION THERAPY | Facility: CLINIC | Age: 52
End: 2022-09-20
Attending: PSYCHIATRY & NEUROLOGY
Payer: COMMERCIAL

## 2022-09-20 VITALS
SYSTOLIC BLOOD PRESSURE: 103 MMHG | DIASTOLIC BLOOD PRESSURE: 65 MMHG | RESPIRATION RATE: 16 BRPM | OXYGEN SATURATION: 99 % | BODY MASS INDEX: 22.42 KG/M2 | HEART RATE: 60 BPM | TEMPERATURE: 98 F | WEIGHT: 130.6 LBS

## 2022-09-20 DIAGNOSIS — G35 MS (MULTIPLE SCLEROSIS) (H): Primary | ICD-10-CM

## 2022-09-20 PROCEDURE — 250N000011 HC RX IP 250 OP 636: Performed by: PSYCHIATRY & NEUROLOGY

## 2022-09-20 PROCEDURE — 96365 THER/PROPH/DIAG IV INF INIT: CPT

## 2022-09-20 PROCEDURE — 96415 CHEMO IV INFUSION ADDL HR: CPT

## 2022-09-20 PROCEDURE — 250N000013 HC RX MED GY IP 250 OP 250 PS 637: Performed by: PSYCHIATRY & NEUROLOGY

## 2022-09-20 PROCEDURE — 96413 CHEMO IV INFUSION 1 HR: CPT

## 2022-09-20 PROCEDURE — 96375 TX/PRO/DX INJ NEW DRUG ADDON: CPT

## 2022-09-20 PROCEDURE — 96366 THER/PROPH/DIAG IV INF ADDON: CPT

## 2022-09-20 PROCEDURE — 258N000003 HC RX IP 258 OP 636: Performed by: PSYCHIATRY & NEUROLOGY

## 2022-09-20 RX ORDER — EPINEPHRINE 1 MG/ML
0.3 INJECTION, SOLUTION INTRAMUSCULAR; SUBCUTANEOUS EVERY 5 MIN PRN
Status: CANCELLED | OUTPATIENT
Start: 2022-09-20

## 2022-09-20 RX ORDER — DIPHENHYDRAMINE HYDROCHLORIDE 50 MG/ML
50 INJECTION INTRAMUSCULAR; INTRAVENOUS
Status: CANCELLED
Start: 2022-09-20

## 2022-09-20 RX ORDER — ACETAMINOPHEN 325 MG/1
650 TABLET ORAL ONCE
Status: COMPLETED | OUTPATIENT
Start: 2022-09-20 | End: 2022-09-20

## 2022-09-20 RX ORDER — DIPHENHYDRAMINE HCL 25 MG
50 CAPSULE ORAL ONCE
Status: CANCELLED
Start: 2022-09-20

## 2022-09-20 RX ORDER — ALBUTEROL SULFATE 0.83 MG/ML
2.5 SOLUTION RESPIRATORY (INHALATION)
Status: CANCELLED | OUTPATIENT
Start: 2022-09-20

## 2022-09-20 RX ORDER — HEPARIN SODIUM (PORCINE) LOCK FLUSH IV SOLN 100 UNIT/ML 100 UNIT/ML
5 SOLUTION INTRAVENOUS
Status: CANCELLED | OUTPATIENT
Start: 2022-09-20

## 2022-09-20 RX ORDER — HEPARIN SODIUM,PORCINE 10 UNIT/ML
5 VIAL (ML) INTRAVENOUS
Status: CANCELLED | OUTPATIENT
Start: 2022-09-20

## 2022-09-20 RX ORDER — ACETAMINOPHEN 325 MG/1
650 TABLET ORAL ONCE
Status: CANCELLED
Start: 2022-09-20

## 2022-09-20 RX ORDER — ALBUTEROL SULFATE 90 UG/1
1-2 AEROSOL, METERED RESPIRATORY (INHALATION)
Status: CANCELLED
Start: 2022-09-20

## 2022-09-20 RX ORDER — METHYLPREDNISOLONE SODIUM SUCCINATE 125 MG/2ML
125 INJECTION, POWDER, LYOPHILIZED, FOR SOLUTION INTRAMUSCULAR; INTRAVENOUS ONCE
Status: CANCELLED | OUTPATIENT
Start: 2022-09-20

## 2022-09-20 RX ORDER — MEPERIDINE HYDROCHLORIDE 25 MG/ML
25 INJECTION INTRAMUSCULAR; INTRAVENOUS; SUBCUTANEOUS EVERY 30 MIN PRN
Status: CANCELLED | OUTPATIENT
Start: 2022-09-20

## 2022-09-20 RX ORDER — METHYLPREDNISOLONE SODIUM SUCCINATE 125 MG/2ML
125 INJECTION, POWDER, LYOPHILIZED, FOR SOLUTION INTRAMUSCULAR; INTRAVENOUS ONCE
Status: COMPLETED | OUTPATIENT
Start: 2022-09-20 | End: 2022-09-20

## 2022-09-20 RX ORDER — METHYLPREDNISOLONE SODIUM SUCCINATE 125 MG/2ML
125 INJECTION, POWDER, LYOPHILIZED, FOR SOLUTION INTRAMUSCULAR; INTRAVENOUS
Status: CANCELLED
Start: 2022-09-20

## 2022-09-20 RX ORDER — DIPHENHYDRAMINE HCL 25 MG
50 CAPSULE ORAL ONCE
Status: COMPLETED | OUTPATIENT
Start: 2022-09-20 | End: 2022-09-20

## 2022-09-20 RX ADMIN — RITUXIMAB-ABBS 500 MG: 10 INJECTION, SOLUTION INTRAVENOUS at 09:55

## 2022-09-20 RX ADMIN — ACETAMINOPHEN 650 MG: 325 TABLET ORAL at 09:28

## 2022-09-20 RX ADMIN — METHYLPREDNISOLONE SODIUM SUCCINATE 125 MG: 125 INJECTION, POWDER, FOR SOLUTION INTRAMUSCULAR; INTRAVENOUS at 09:30

## 2022-09-20 RX ADMIN — DIPHENHYDRAMINE HYDROCHLORIDE 50 MG: 25 CAPSULE ORAL at 09:28

## 2022-09-20 ASSESSMENT — PAIN SCALES - GENERAL: PAINLEVEL: NO PAIN (0)

## 2022-09-20 NOTE — LETTER
9/20/2022         RE: Ewa Salter  [address] [place] [address]        Dear Colleague,    Thank you for referring your patient, Ewa Salter, to the [facility]. Please see a copy of my visit note below.    Infusion Nursing Note:  Ewa Salter presents today for Rituxan.    Patient seen by provider today: No   present during visit today: Not Applicable.    Note:   Premeds: Tylenol, Benadryl and Solu-medrol.   Rituxan infused at 200 ml/hr for the first 30 minutes, then increased to 400 ml/hr for remaining 60 minutes.     Intravenous Access:  Peripheral IV placed.    Treatment Conditions:  Biological Infusion Checklist:  ~~~ NOTE: If the patient answers yes to any of the questions below, hold the infusion and contact ordering provider or on-call provider.    1. Have you recently had an elevated temperature, fever, chills, productive cough, coughing for 3 weeks or longer or hemoptysis, abnormal vital signs, night sweats,  chest pain or have you noticed a decrease in your appetite, unexplained weight loss or fatigue? No  2. Do you have any open wounds or new incisions? No  3. Do you have any recent or upcoming hospitalizations, surgeries or dental procedures? No  4. Do you currently have or recently have had any signs of illness or infection or are you on any antibiotics? No  5. Have you had any new, sudden or worsening abdominal pain? No  6. Have you or anyone in your household received a live vaccination in the past 4 weeks? Please note:  No live vaccines while on biologic/chemotherapy until 6 months after the last treatment.  Patient can receive the flu vaccine (shot only) and the pneumovax.  It is optimal for the patient to get these vaccines mid cycle, but they can be given at any time as long as it is not on the day of the infusion. No  7. Have you recently been diagnosed with any new nervous system diseases (ie. Multiple  sclerosis, Guillain [place], seizures, neurological changes) or cancer diagnosis? No  8. Are you on any form of radiation or chemotherapy? No  9. Are you pregnant or breast feeding or do you have plans of pregnancy in the future? No  10. Have you been having any signs of worsening depression or suicidal ideations?  (benlysta only) No  11. Have there been any other new onset medical symptoms? No   12. Hep B status: nonreactive 4/2/2021  Administrations This Visit     acetaminophen (TYLENOL) tablet 650 mg     Admin Date  09/20/2022 Action  Given Dose  650 mg Route  Oral Administered By  Francia Singh RN          diphenhydrAMINE (BENADRYL) capsule 50 mg     Admin Date  09/20/2022 Action  Given Dose  50 mg Route  Oral Administered By  Francia Singh RN          methylPREDNISolone sodium succinate (solu-MEDROL) injection 125 mg     Admin Date  09/20/2022 Action  Given Dose  125 mg Route  Intravenous Administered By  Francia Singh RN          riTUXimab-abbs (TRUXIMA) 500 mg in sodium chloride 0.9 % 500 mL NON-oncology infusion     Admin Date  09/20/2022 Action  New Bag Dose  500 mg Route  Intravenous Administered By  Francia Singh, ERIC              /51 (BP Location: Left arm, Patient Position: Semi-Jones's, Cuff Size: Adult Regular)   Pulse 67   Temp 98  F (36.7  C) (Oral)   Resp 16   Wt 59.2 kg (130 lb 9.6 oz)   SpO2 99%   BMI 22.42 kg/m      Post Infusion Assessment:  Patient tolerated infusion without incident.  Blood return noted pre and post infusion.  Site patent and intact, free from redness, edema or discomfort.  No evidence of extravasations.  Access discontinued per protocol.  Biologic Infusion Post Education: Call the triage nurse at your clinic or seek medical attention if you have chills and/or temperature greater than or equal to 100.5, uncontrolled nausea/vomiting, diarrhea, constipation, dizziness, shortness of breath, chest pain, heart palpitations, weakness or any other new or  concerning symptoms, questions or concerns.  You cannot have any live virus vaccines prior to or during treatment or up to 6 months post infusion.  If you have an upcoming surgery, medical procedure or dental procedure during treatment, this should be discussed with your ordering physician and your surgeon/dentist.  If you are having any concerning symptom, if you are unsure if you should get your next infusion or wish to speak to a provider before your next infusion, please call your care coordinator or triage nurse at your clinic to notify them so we can adequately serve you.     Discharge Plan:   Discharge instructions reviewed with: Patient.  Patient and/or family verbalized understanding of discharge instructions and all questions answered.  AVS to patient via [employer].     Patient discharged in stable condition accompanied by: self.  Departure Mode: Ambulatory.    Francia Singh RN                        Again, thank you for allowing me to participate in the care of your patient.        Sincerely,        [facility]

## 2022-09-20 NOTE — PROGRESS NOTES
Infusion Nursing Note:  Ewa Rivera Seifert presents today for Rituxan.    Patient seen by provider today: No   present during visit today: Not Applicable.    Note:   Premeds: Tylenol, Benadryl and Solu-medrol.   Rituxan infused at 200 ml/hr for the first 30 minutes, then increased to 400 ml/hr for remaining 60 minutes.     Intravenous Access:  Peripheral IV placed.    Treatment Conditions:  Biological Infusion Checklist:  ~~~ NOTE: If the patient answers yes to any of the questions below, hold the infusion and contact ordering provider or on-call provider.    1. Have you recently had an elevated temperature, fever, chills, productive cough, coughing for 3 weeks or longer or hemoptysis, abnormal vital signs, night sweats,  chest pain or have you noticed a decrease in your appetite, unexplained weight loss or fatigue? No  2. Do you have any open wounds or new incisions? No  3. Do you have any recent or upcoming hospitalizations, surgeries or dental procedures? No  4. Do you currently have or recently have had any signs of illness or infection or are you on any antibiotics? No  5. Have you had any new, sudden or worsening abdominal pain? No  6. Have you or anyone in your household received a live vaccination in the past 4 weeks? Please note:  No live vaccines while on biologic/chemotherapy until 6 months after the last treatment.  Patient can receive the flu vaccine (shot only) and the pneumovax.  It is optimal for the patient to get these vaccines mid cycle, but they can be given at any time as long as it is not on the day of the infusion. No  7. Have you recently been diagnosed with any new nervous system diseases (ie. Multiple sclerosis, Guillain [place]mont, seizures, neurological changes) or cancer diagnosis? No  8. Are you on any form of radiation or chemotherapy? No  9. Are you pregnant or breast feeding or do you have plans of pregnancy in the future? No  10. Have you been having any signs of worsening  depression or suicidal ideations?  (benlysta only) No  11. Have there been any other new onset medical symptoms? No   12. Hep B status: nonreactive 4/2/2021  Administrations This Visit     acetaminophen (TYLENOL) tablet 650 mg     Admin Date  09/20/2022 Action  Given Dose  650 mg Route  Oral Administered By  Francia Singh RN          diphenhydrAMINE (BENADRYL) capsule 50 mg     Admin Date  09/20/2022 Action  Given Dose  50 mg Route  Oral Administered By  Francia Singh RN          methylPREDNISolone sodium succinate (solu-MEDROL) injection 125 mg     Admin Date  09/20/2022 Action  Given Dose  125 mg Route  Intravenous Administered By  Francia Singh RN          riTUXimab-abbs (TRUXIMA) 500 mg in sodium chloride 0.9 % 500 mL NON-oncology infusion     Admin Date  09/20/2022 Action  New Bag Dose  500 mg Route  Intravenous Administered By  Francia Singh RN              /51 (BP Location: Left arm, Patient Position: Semi-Jones's, Cuff Size: Adult Regular)   Pulse 67   Temp 98  F (36.7  C) (Oral)   Resp 16   Wt 59.2 kg (130 lb 9.6 oz)   SpO2 99%   BMI 22.42 kg/m      Post Infusion Assessment:  Patient tolerated infusion without incident.  Blood return noted pre and post infusion.  Site patent and intact, free from redness, edema or discomfort.  No evidence of extravasations.  Access discontinued per protocol.  Biologic Infusion Post Education: Call the triage nurse at your clinic or seek medical attention if you have chills and/or temperature greater than or equal to 100.5, uncontrolled nausea/vomiting, diarrhea, constipation, dizziness, shortness of breath, chest pain, heart palpitations, weakness or any other new or concerning symptoms, questions or concerns.  You cannot have any live virus vaccines prior to or during treatment or up to 6 months post infusion.  If you have an upcoming surgery, medical procedure or dental procedure during treatment, this should be discussed with your ordering  physician and your surgeon/dentist.  If you are having any concerning symptom, if you are unsure if you should get your next infusion or wish to speak to a provider before your next infusion, please call your care coordinator or triage nurse at your clinic to notify them so we can adequately serve you.     Discharge Plan:   Discharge instructions reviewed with: Patient.  Patient and/or family verbalized understanding of discharge instructions and all questions answered.  AVS to patient via MYCHART.     Patient discharged in stable condition accompanied by: self.  Departure Mode: Ambulatory.    Francia Singh RN

## 2022-09-20 NOTE — PATIENT INSTRUCTIONS
Dear Ewa Salter    Thank you for choosing [facility] ([facility]) for your infusion.  The following information is a summary of our appointment as well as important reminders.      EDUCATION POST BIOLOGICAL/CHEMOTHERAPY INFUSION  Call the triage nurse at your clinic or seek medical attention if you have chills and/or temperature greater than or equal to 100.5, uncontrolled nausea/vomiting, diarrhea, constipation, dizziness, shortness of breath, chest pain, heart palpitations, weakness or any other new or concerning symptoms, questions or concerns.  You can not have any live virus vaccines prior to or during treatment or up to 6 months post infusion.  If you have an upcoming surgery, medical procedure or dental procedure during treatment, this should be discussed with your ordering physician and your surgeon/dentist.  If you are having any concerning symptom, if you are unsure if you should get your next infusion or wish to speak to a provider before your next infusion, please call your care coordinator or triage nurse at your clinic to notify them so we can adequately serve you.     We look forward in seeing you on your next appointment here at [facility] ([facility]).  Please don t hesitate to call us at [phone] to reschedule any of your appointments or to speak with one of the [facility] registered nurses.  It was a pleasure taking care of you today.    Sincerely,    [facility]  [address] [place] [address]  [id number]  Phone:  [phone]

## 2022-09-21 ENCOUNTER — INFUSION THERAPY VISIT (OUTPATIENT)
Dept: NURSING | Facility: CLINIC | Age: 52
End: 2022-09-21
Payer: COMMERCIAL

## 2022-09-21 DIAGNOSIS — Z29.89 PROPHYLACTIC IMMUNOTHERAPY: Primary | ICD-10-CM

## 2022-09-21 PROCEDURE — M0220 PR INJECTION TIXAGEVIMAB & CILGAVIMAB (EVUSHELD): HCPCS | Performed by: FAMILY MEDICINE

## 2022-09-21 NOTE — PROGRESS NOTES
EVUSHELD Administration Note:  Ewa Salter presents today for EVUSHELD.   present during visit today: Not Applicable.    Consent:   Evusheld Consent   Confirmed patient received the Emergency Use Authorization Fact Sheet for Patients, Parents and Caregivers prior to receiving medication. We discussed the following risks and benefits of receiving EVUSHELD, as well as alternative treatments and the patient wished to proceed with EVUSHELD.     Providers believe the benefits of Evusheld outweigh the risks for all moderately to severely immunocompromised patients.      Benefits:   Evusheld is a synthetic antibody that provides protection against COVID-19 for 6 months and is recommended for patients that have a weakened immune system that may not be able to make antibodies themselves.     Risks:    There is a very small risk of allergic reactions and you should notify us if you have any symptoms of an allergic reaction after the injection. There were also some extremely rare cardiac events reported in the initial trials in people that already had heart disease, but experts do not think these were caused by the medication. We will observe you for any chest pain or trouble breathing after the injections and you can reach out to your provider if any of these symptoms develop.     Alternatives:   Vaccines to prevent COVID-19 are approved or available under Emergency Use Authorization. Use of EVUSHELD does not replace vaccination against COVID-19. You can continue to mask and isolate to avoid infections. It is your choice to receive or not receive EVUSHELD. Should you decide not to receive EVUSHELD, it will not change your standard medical care.     Patient does consent to the injection.        Post Injection Assessment:   Patient tolerated injection without incident.  Patient observed for 60 minutes post injection per protocol.      Patient was observed in the room for a minimum of 10 minutes after  injection per standard, then remained in the buidling for a total 60 minute observation period.         Discharge Plan:    Patient and/or family verbalized understanding of discharge instructions and all questions answered.  Patient discharged in stable condition accompanied by: self.     Oliverio Terrell RN BSN  [facility]

## 2023-06-01 ENCOUNTER — HEALTH MAINTENANCE LETTER (OUTPATIENT)
Age: 53
End: 2023-06-01

## 2023-06-09 DIAGNOSIS — C49.21 ATYPICAL LIPOMATOUS TUMOR OF RIGHT LOWER EXTREMITY (H): Primary | ICD-10-CM

## 2023-10-03 ENCOUNTER — OFFICE VISIT (OUTPATIENT)
Dept: NEUROLOGY | Facility: CLINIC | Age: 53
End: 2023-10-03
Attending: PSYCHIATRY & NEUROLOGY
Payer: COMMERCIAL

## 2023-10-03 VITALS
BODY MASS INDEX: 21.7 KG/M2 | SYSTOLIC BLOOD PRESSURE: 125 MMHG | WEIGHT: 126.4 LBS | OXYGEN SATURATION: 98 % | HEART RATE: 78 BPM | DIASTOLIC BLOOD PRESSURE: 84 MMHG

## 2023-10-03 DIAGNOSIS — G35 MS (MULTIPLE SCLEROSIS) (H): Primary | ICD-10-CM

## 2023-10-03 PROCEDURE — 99215 OFFICE O/P EST HI 40 MIN: CPT | Performed by: PSYCHIATRY & NEUROLOGY

## 2023-10-03 PROCEDURE — 99214 OFFICE O/P EST MOD 30 MIN: CPT | Performed by: PSYCHIATRY & NEUROLOGY

## 2023-10-03 ASSESSMENT — PAIN SCALES - GENERAL: PAINLEVEL: NO PAIN (0)
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
## 2023-10-03 NOTE — NURSING NOTE
Chief Complaint   Patient presents with    MS    RECHECK     MS follow up      Vitals were taken and medications were reconciled.   Eusebio Cueto, EMT  7:49 AM
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
## 2024-02-01 ENCOUNTER — TELEPHONE (OUTPATIENT)
Dept: NEUROLOGY | Facility: CLINIC | Age: 54
End: 2024-02-01
Payer: COMMERCIAL

## 2024-02-01 DIAGNOSIS — G35 MULTIPLE SCLEROSIS (H): Primary | ICD-10-CM

## 2024-02-01 NOTE — TELEPHONE ENCOUNTER
Ewa to check B cells in February.  Lab ordered and left a VMM reminding her. Will also send a my chart message.    Temi Puente RN

## 2024-02-09 ENCOUNTER — LAB (OUTPATIENT)
Dept: LAB | Facility: CLINIC | Age: 54
End: 2024-02-09
Payer: COMMERCIAL

## 2024-02-09 DIAGNOSIS — G35 MULTIPLE SCLEROSIS (H): ICD-10-CM

## 2024-02-09 PROCEDURE — 36415 COLL VENOUS BLD VENIPUNCTURE: CPT

## 2024-02-09 PROCEDURE — 86355 B CELLS TOTAL COUNT: CPT

## 2024-02-10 LAB
CD19 B CELL COMMENT: ABNORMAL
CD19 CELLS # BLD: 4 CELLS/UL (ref 107–698)
CD19 CELLS NFR BLD: <1 % (ref 6–27)

## 2024-05-16 ENCOUNTER — LAB (OUTPATIENT)
Dept: LAB | Facility: CLINIC | Age: 54
End: 2024-05-16
Payer: COMMERCIAL

## 2024-05-16 DIAGNOSIS — G35 MULTIPLE SCLEROSIS (H): ICD-10-CM

## 2024-05-16 PROCEDURE — 86355 B CELLS TOTAL COUNT: CPT

## 2024-05-16 PROCEDURE — 36415 COLL VENOUS BLD VENIPUNCTURE: CPT

## 2024-05-17 LAB
CD19 B CELL COMMENT: ABNORMAL
CD19 CELLS # BLD: 3 CELLS/UL (ref 107–698)
CD19 CELLS NFR BLD: <1 % (ref 6–27)

## 2024-06-14 ENCOUNTER — LAB REQUISITION (OUTPATIENT)
Dept: LAB | Facility: CLINIC | Age: 54
End: 2024-06-14

## 2024-06-14 DIAGNOSIS — Z12.4 ENCOUNTER FOR SCREENING FOR MALIGNANT NEOPLASM OF CERVIX: ICD-10-CM

## 2024-06-14 PROCEDURE — G0145 SCR C/V CYTO,THINLAYER,RESCR: HCPCS | Performed by: OBSTETRICS & GYNECOLOGY

## 2024-06-14 PROCEDURE — 87624 HPV HI-RISK TYP POOLED RSLT: CPT | Performed by: OBSTETRICS & GYNECOLOGY

## 2024-06-17 LAB
HPV HR 12 DNA CVX QL NAA+PROBE: NEGATIVE
HPV16 DNA CVX QL NAA+PROBE: NEGATIVE
HPV18 DNA CVX QL NAA+PROBE: NEGATIVE
HUMAN PAPILLOMA VIRUS FINAL DIAGNOSIS: NORMAL

## 2024-06-20 LAB
BKR LAB AP GYN ADEQUACY: NORMAL
BKR LAB AP GYN INTERPRETATION: NORMAL
BKR LAB AP GYN OTHER FINDINGS: NORMAL
BKR LAB AP LMP: NORMAL
BKR LAB AP PREVIOUS ABNORMAL: NORMAL
PATH REPORT.COMMENTS IMP SPEC: NORMAL
PATH REPORT.COMMENTS IMP SPEC: NORMAL

## 2024-09-06 ENCOUNTER — LAB (OUTPATIENT)
Dept: LAB | Facility: CLINIC | Age: 54
End: 2024-09-06
Payer: COMMERCIAL

## 2024-09-06 DIAGNOSIS — G35 MULTIPLE SCLEROSIS (H): ICD-10-CM

## 2024-09-06 PROCEDURE — 86355 B CELLS TOTAL COUNT: CPT

## 2024-09-06 PROCEDURE — 36415 COLL VENOUS BLD VENIPUNCTURE: CPT

## 2024-09-07 LAB
CD19 B CELL COMMENT: ABNORMAL
CD19 CELLS # BLD: 14 CELLS/UL (ref 107–698)
CD19 CELLS NFR BLD: 1 % (ref 6–27)

## 2024-09-30 ENCOUNTER — ANCILLARY PROCEDURE (OUTPATIENT)
Dept: MRI IMAGING | Facility: CLINIC | Age: 54
End: 2024-09-30
Attending: PSYCHIATRY & NEUROLOGY
Payer: COMMERCIAL

## 2024-09-30 DIAGNOSIS — G35 MS (MULTIPLE SCLEROSIS) (H): ICD-10-CM

## 2024-09-30 PROCEDURE — A9585 GADOBUTROL INJECTION: HCPCS | Performed by: RADIOLOGY

## 2024-09-30 PROCEDURE — 70553 MRI BRAIN STEM W/O & W/DYE: CPT | Performed by: RADIOLOGY

## 2024-09-30 RX ORDER — GADOBUTROL 604.72 MG/ML
7.5 INJECTION INTRAVENOUS ONCE
Status: COMPLETED | OUTPATIENT
Start: 2024-09-30 | End: 2024-09-30

## 2024-09-30 RX ADMIN — GADOBUTROL 6 ML: 604.72 INJECTION INTRAVENOUS at 13:03

## 2024-09-30 NOTE — DISCHARGE INSTRUCTIONS
MRI Contrast Discharge Instructions    The IV contrast you received today will pass out of your body in your  urine. This will happen in the next 24 hours. You will not feel this process.  Your urine will not change color.    Drink at least 4 extra glasses of water or juice today (unless your doctor  has restricted your fluids). This reduces the stress on your kidneys.  You may take your regular medicines.    If you are on dialysis: It is best to have dialysis today.    If you have a reaction: Most reactions happen right away. If you have  any new symptoms after leaving the hospital (such as hives or swelling),  call your hospital at the correct number below. Or call your family doctor.  If you have breathing distress or wheezing, call 911.    Special instructions: ***    I have read and understand the above information.    Signature:______________________________________ Date:___________    Staff:__________________________________________ Date:___________     Time:__________    [facility] Departments:    ___[place]: [phone]  ___[facility]: [phone]  ___[place]: [phone] ___[facility]: [phone]  ___[facility]: [phone]  ___[facility]: [phone]  ___[phone]  ___[facility]: [phone]  ___[phone]

## 2024-10-01 ENCOUNTER — OFFICE VISIT (OUTPATIENT)
Dept: NEUROLOGY | Facility: CLINIC | Age: 54
End: 2024-10-01
Attending: PSYCHIATRY & NEUROLOGY
Payer: COMMERCIAL

## 2024-10-01 VITALS
HEART RATE: 76 BPM | DIASTOLIC BLOOD PRESSURE: 65 MMHG | BODY MASS INDEX: 23.24 KG/M2 | SYSTOLIC BLOOD PRESSURE: 96 MMHG | WEIGHT: 135.4 LBS | OXYGEN SATURATION: 98 %

## 2024-10-01 DIAGNOSIS — G35 MS (MULTIPLE SCLEROSIS) (H): Primary | ICD-10-CM

## 2024-10-01 PROCEDURE — G2211 COMPLEX E/M VISIT ADD ON: HCPCS | Performed by: PSYCHIATRY & NEUROLOGY

## 2024-10-01 PROCEDURE — 99214 OFFICE O/P EST MOD 30 MIN: CPT | Performed by: PSYCHIATRY & NEUROLOGY

## 2024-10-01 ASSESSMENT — PAIN SCALES - GENERAL: PAINLEVEL: NO PAIN (0)
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
## 2024-10-01 NOTE — NURSING NOTE
Chief Complaint   Patient presents with    MS    RECHECK     MS follow up      Vitals were taken and medications were reconciled.    Eusebio Cueto, EMT  7:58 AM
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
## 2024-11-08 ENCOUNTER — LAB (OUTPATIENT)
Dept: LAB | Facility: CLINIC | Age: 54
End: 2024-11-08
Payer: COMMERCIAL

## 2024-11-08 DIAGNOSIS — G35 MULTIPLE SCLEROSIS (H): ICD-10-CM

## 2024-11-08 PROCEDURE — 36415 COLL VENOUS BLD VENIPUNCTURE: CPT

## 2024-11-08 PROCEDURE — 86355 B CELLS TOTAL COUNT: CPT

## 2024-11-09 LAB
CD19 B CELL COMMENT: ABNORMAL
CD19 CELLS # BLD: 19 CELLS/UL (ref 107–698)
CD19 CELLS NFR BLD: 1 % (ref 6–27)

## 2025-01-13 ENCOUNTER — LAB (OUTPATIENT)
Dept: LAB | Facility: CLINIC | Age: 55
End: 2025-01-13
Payer: COMMERCIAL

## 2025-01-13 DIAGNOSIS — G35 MULTIPLE SCLEROSIS (H): ICD-10-CM

## 2025-01-13 PROCEDURE — 86355 B CELLS TOTAL COUNT: CPT

## 2025-01-13 PROCEDURE — 36415 COLL VENOUS BLD VENIPUNCTURE: CPT

## 2025-01-14 LAB
CD19 B CELL COMMENT: ABNORMAL
CD19 CELLS # BLD: 13 CELLS/UL (ref 107–698)
CD19 CELLS NFR BLD: 1 % (ref 6–27)

## 2025-06-17 ENCOUNTER — LAB REQUISITION (OUTPATIENT)
Dept: LAB | Facility: CLINIC | Age: 55
End: 2025-06-17

## 2025-06-17 DIAGNOSIS — Z12.4 ENCOUNTER FOR SCREENING FOR MALIGNANT NEOPLASM OF CERVIX: ICD-10-CM

## 2025-06-17 PROCEDURE — 87624 HPV HI-RISK TYP POOLED RSLT: CPT | Performed by: OBSTETRICS & GYNECOLOGY

## 2025-06-22 LAB
BKR AP ASSOCIATED HPV REPORT: NORMAL
BKR LAB AP GYN ADEQUACY: NORMAL
BKR LAB AP GYN INTERPRETATION: NORMAL
BKR LAB AP LMP: NORMAL
BKR LAB AP PREVIOUS ABNL DX: NORMAL
BKR LAB AP PREVIOUS ABNORMAL: NORMAL
PATH REPORT.COMMENTS IMP SPEC: NORMAL
PATH REPORT.COMMENTS IMP SPEC: NORMAL
PATH REPORT.RELEVANT HX SPEC: NORMAL

## 2025-06-23 ENCOUNTER — RESULTS FOLLOW-UP (OUTPATIENT)
Dept: NEUROLOGY | Facility: CLINIC | Age: 55
End: 2025-06-23

## 2025-07-22 ENCOUNTER — LAB REQUISITION (OUTPATIENT)
Dept: LAB | Facility: CLINIC | Age: 55
End: 2025-07-22

## 2025-07-22 DIAGNOSIS — N93.9 ABNORMAL UTERINE AND VAGINAL BLEEDING, UNSPECIFIED: ICD-10-CM

## 2025-07-22 PROCEDURE — 88305 TISSUE EXAM BY PATHOLOGIST: CPT | Performed by: PATHOLOGY

## 2025-08-05 LAB
PATH REPORT.COMMENTS IMP SPEC: NORMAL
PATH REPORT.COMMENTS IMP SPEC: NORMAL
PATH REPORT.FINAL DX SPEC: NORMAL
PATH REPORT.GROSS SPEC: NORMAL
PATH REPORT.MICROSCOPIC SPEC OTHER STN: NORMAL
PATH REPORT.RELEVANT HX SPEC: NORMAL
PHOTO IMAGE: NORMAL

## 2025-08-18 ENCOUNTER — TELEPHONE (OUTPATIENT)
Dept: NEUROLOGY | Facility: CLINIC | Age: 55
End: 2025-08-18
Payer: COMMERCIAL

## 2025-08-18 DIAGNOSIS — G35 MULTIPLE SCLEROSIS (H): Primary | ICD-10-CM

## (undated) DEVICE — SUCTION MANIFOLD NEPTUNE 2 SYS 1 PORT 702-025-000

## (undated) DEVICE — DRSG STERI STRIP 1/2X4" R1547

## (undated) DEVICE — ESU GROUND PAD ADULT W/CORD E7507

## (undated) DEVICE — SOL NACL 0.9% IRRIG 1000ML BOTTLE 2F7124

## (undated) DEVICE — LINEN ORTHO PACK 5446

## (undated) DEVICE — CAST PADDING 4" UNSTERILE 9044

## (undated) DEVICE — PREP SKIN SCRUB TRAY 4461A

## (undated) DEVICE — GLOVE PROTEXIS POWDER FREE SMT 7.0  2D72PT70X

## (undated) DEVICE — DRAPE STERI U 1015

## (undated) DEVICE — PREP DURAPREP 26ML APL 8630

## (undated) DEVICE — SPECIMEN CONTAINER 5OZ STERILE 2600SA

## (undated) DEVICE — PREP DURAPREP REMOVER 4OZ 8611

## (undated) DEVICE — SU MONOCRYL 4-0 PS-2 18" UND Y496G

## (undated) DEVICE — GLOVE PROTEXIS W/NEU-THERA 7.0  2D73TE70

## (undated) DEVICE — GLOVE PROTEXIS BLUE W/NEU-THERA 7.5  2D73EB75

## (undated) DEVICE — SU VICRYL 2-0 SH 27" UND J417H

## (undated) DEVICE — PREP POVIDONE-IODINE 7.5% SCRUB 4OZ BOTTLE MDS093945

## (undated) DEVICE — GOWN XLG DISP 9545

## (undated) RX ORDER — FENTANYL CITRATE 50 UG/ML
INJECTION, SOLUTION INTRAMUSCULAR; INTRAVENOUS
Status: DISPENSED
Start: 2021-01-28

## (undated) RX ORDER — PROPOFOL 10 MG/ML
INJECTION, EMULSION INTRAVENOUS
Status: DISPENSED
Start: 2021-01-28

## (undated) RX ORDER — CEFAZOLIN SODIUM 1 G/3ML
INJECTION, POWDER, FOR SOLUTION INTRAMUSCULAR; INTRAVENOUS
Status: DISPENSED
Start: 2021-01-28

## (undated) RX ORDER — LIDOCAINE HYDROCHLORIDE 20 MG/ML
INJECTION, SOLUTION EPIDURAL; INFILTRATION; INTRACAUDAL; PERINEURAL
Status: DISPENSED
Start: 2021-01-28

## (undated) RX ORDER — DEXAMETHASONE SODIUM PHOSPHATE 4 MG/ML
INJECTION, SOLUTION INTRA-ARTICULAR; INTRALESIONAL; INTRAMUSCULAR; INTRAVENOUS; SOFT TISSUE
Status: DISPENSED
Start: 2021-01-28

## (undated) RX ORDER — OXYCODONE HYDROCHLORIDE 5 MG/1
TABLET ORAL
Status: DISPENSED
Start: 2021-01-28

## (undated) RX ORDER — ONDANSETRON 2 MG/ML
INJECTION INTRAMUSCULAR; INTRAVENOUS
Status: DISPENSED
Start: 2021-01-28